# Patient Record
Sex: FEMALE | Race: WHITE | Employment: PART TIME | URBAN - METROPOLITAN AREA
[De-identification: names, ages, dates, MRNs, and addresses within clinical notes are randomized per-mention and may not be internally consistent; named-entity substitution may affect disease eponyms.]

---

## 2023-03-30 ENCOUNTER — APPOINTMENT (OUTPATIENT)
Dept: CT IMAGING | Age: 58
DRG: 263 | End: 2023-03-30
Payer: MEDICAID

## 2023-03-30 ENCOUNTER — HOSPITAL ENCOUNTER (INPATIENT)
Age: 58
LOS: 5 days | Discharge: HOME | DRG: 263 | End: 2023-04-05
Attending: EMERGENCY MEDICINE | Admitting: INTERNAL MEDICINE
Payer: MEDICAID

## 2023-03-30 ENCOUNTER — APPOINTMENT (OUTPATIENT)
Dept: GENERAL RADIOLOGY | Age: 58
DRG: 263 | End: 2023-03-30
Payer: MEDICAID

## 2023-03-30 ENCOUNTER — APPOINTMENT (OUTPATIENT)
Dept: ULTRASOUND IMAGING | Age: 58
DRG: 263 | End: 2023-03-30
Payer: MEDICAID

## 2023-03-30 DIAGNOSIS — K83.1 OBSTRUCTION OF BILE DUCT: ICD-10-CM

## 2023-03-30 DIAGNOSIS — K80.50 CHOLEDOCHOLITHIASIS: Primary | ICD-10-CM

## 2023-03-30 DIAGNOSIS — K81.0 ACUTE CHOLECYSTITIS: ICD-10-CM

## 2023-03-30 LAB
ALBUMIN SERPL-MCNC: 4.3 G/DL (ref 3.5–5.2)
ALP SERPL-CCNC: 158 U/L (ref 35–104)
ALT SERPL-CCNC: 942 U/L (ref 0–32)
ANION GAP SERPL CALCULATED.3IONS-SCNC: 9 MMOL/L (ref 7–16)
AST SERPL-CCNC: 1280 U/L (ref 0–31)
BACTERIA URNS QL MICRO: NORMAL /HPF
BASOPHILS # BLD: 0.11 E9/L (ref 0–0.2)
BASOPHILS NFR BLD: 1.2 % (ref 0–2)
BILIRUB DIRECT SERPL-MCNC: 2.2 MG/DL (ref 0–0.3)
BILIRUB INDIRECT SERPL-MCNC: 0.4 MG/DL (ref 0–1)
BILIRUB SERPL-MCNC: 2.6 MG/DL (ref 0–1.2)
BILIRUB UR QL STRIP: NEGATIVE
BUN SERPL-MCNC: 10 MG/DL (ref 6–20)
CALCIUM SERPL-MCNC: 9.1 MG/DL (ref 8.6–10.2)
CHLORIDE SERPL-SCNC: 103 MMOL/L (ref 98–107)
CLARITY UR: CLEAR
CO2 SERPL-SCNC: 26 MMOL/L (ref 22–29)
COLOR UR: YELLOW
CREAT SERPL-MCNC: 0.6 MG/DL (ref 0.5–1)
EOSINOPHIL # BLD: 0.12 E9/L (ref 0.05–0.5)
EOSINOPHIL NFR BLD: 1.3 % (ref 0–6)
ERYTHROCYTE [DISTWIDTH] IN BLOOD BY AUTOMATED COUNT: 14.2 FL (ref 11.5–15)
GLUCOSE SERPL-MCNC: 90 MG/DL (ref 74–99)
GLUCOSE UR STRIP-MCNC: NEGATIVE MG/DL
HCT VFR BLD AUTO: 40.1 % (ref 34–48)
HGB BLD-MCNC: 13.6 G/DL (ref 11.5–15.5)
HGB UR QL STRIP: NEGATIVE
IMM GRANULOCYTES # BLD: 0.03 E9/L
IMM GRANULOCYTES NFR BLD: 0.3 % (ref 0–5)
KETONES UR STRIP-MCNC: NEGATIVE MG/DL
LACTATE BLDV-SCNC: 0.7 MMOL/L (ref 0.5–2.2)
LEUKOCYTE ESTERASE UR QL STRIP: NEGATIVE
LIPASE: 25 U/L (ref 13–60)
LYMPHOCYTES # BLD: 2.55 E9/L (ref 1.5–4)
LYMPHOCYTES NFR BLD: 28.4 % (ref 20–42)
MCH RBC QN AUTO: 29.2 PG (ref 26–35)
MCHC RBC AUTO-ENTMCNC: 33.9 % (ref 32–34.5)
MCV RBC AUTO: 86.1 FL (ref 80–99.9)
MONOCYTES # BLD: 0.8 E9/L (ref 0.1–0.95)
MONOCYTES NFR BLD: 8.9 % (ref 2–12)
NEUTROPHILS # BLD: 5.38 E9/L (ref 1.8–7.3)
NEUTS SEG NFR BLD: 59.9 % (ref 43–80)
NITRITE UR QL STRIP: NEGATIVE
PH UR STRIP: 6.5 [PH] (ref 5–9)
PLATELET # BLD AUTO: 248 E9/L (ref 130–450)
PMV BLD AUTO: 12.1 FL (ref 7–12)
POTASSIUM SERPL-SCNC: 3.7 MMOL/L (ref 3.5–5)
PROT SERPL-MCNC: 7.3 G/DL (ref 6.4–8.3)
PROT UR STRIP-MCNC: NEGATIVE MG/DL
RBC # BLD AUTO: 4.66 E12/L (ref 3.5–5.5)
RBC #/AREA URNS HPF: NORMAL /HPF (ref 0–2)
SODIUM SERPL-SCNC: 138 MMOL/L (ref 132–146)
SP GR UR STRIP: <=1.005 (ref 1–1.03)
TROPONIN, HIGH SENSITIVITY: <6 NG/L (ref 0–9)
UROBILINOGEN UR STRIP-ACNC: 0.2 E.U./DL
WBC # BLD: 9 E9/L (ref 4.5–11.5)
WBC #/AREA URNS HPF: NORMAL /HPF (ref 0–5)

## 2023-03-30 PROCEDURE — 76705 ECHO EXAM OF ABDOMEN: CPT

## 2023-03-30 PROCEDURE — 71046 X-RAY EXAM CHEST 2 VIEWS: CPT

## 2023-03-30 PROCEDURE — 81001 URINALYSIS AUTO W/SCOPE: CPT

## 2023-03-30 PROCEDURE — 2500000003 HC RX 250 WO HCPCS: Performed by: EMERGENCY MEDICINE

## 2023-03-30 PROCEDURE — 80076 HEPATIC FUNCTION PANEL: CPT

## 2023-03-30 PROCEDURE — 99285 EMERGENCY DEPT VISIT HI MDM: CPT

## 2023-03-30 PROCEDURE — 74177 CT ABD & PELVIS W/CONTRAST: CPT

## 2023-03-30 PROCEDURE — 84484 ASSAY OF TROPONIN QUANT: CPT

## 2023-03-30 PROCEDURE — 83605 ASSAY OF LACTIC ACID: CPT

## 2023-03-30 PROCEDURE — 80048 BASIC METABOLIC PNL TOTAL CA: CPT

## 2023-03-30 PROCEDURE — 93005 ELECTROCARDIOGRAM TRACING: CPT | Performed by: EMERGENCY MEDICINE

## 2023-03-30 PROCEDURE — 96365 THER/PROPH/DIAG IV INF INIT: CPT

## 2023-03-30 PROCEDURE — 83690 ASSAY OF LIPASE: CPT

## 2023-03-30 PROCEDURE — 96375 TX/PRO/DX INJ NEW DRUG ADDON: CPT

## 2023-03-30 PROCEDURE — 2580000003 HC RX 258: Performed by: EMERGENCY MEDICINE

## 2023-03-30 PROCEDURE — 6360000004 HC RX CONTRAST MEDICATION: Performed by: RADIOLOGY

## 2023-03-30 PROCEDURE — 6360000002 HC RX W HCPCS: Performed by: EMERGENCY MEDICINE

## 2023-03-30 PROCEDURE — 85025 COMPLETE CBC W/AUTO DIFF WBC: CPT

## 2023-03-30 RX ORDER — METRONIDAZOLE 500 MG/100ML
500 INJECTION, SOLUTION INTRAVENOUS ONCE
Status: COMPLETED | OUTPATIENT
Start: 2023-03-30 | End: 2023-03-31

## 2023-03-30 RX ADMIN — WATER 2000 MG: 1 INJECTION INTRAMUSCULAR; INTRAVENOUS; SUBCUTANEOUS at 23:01

## 2023-03-30 RX ADMIN — METRONIDAZOLE 500 MG: 500 INJECTION, SOLUTION INTRAVENOUS at 23:13

## 2023-03-30 RX ADMIN — IOPAMIDOL 75 ML: 755 INJECTION, SOLUTION INTRAVENOUS at 20:49

## 2023-03-30 ASSESSMENT — PAIN DESCRIPTION - DESCRIPTORS: DESCRIPTORS_2: ACHING

## 2023-03-30 ASSESSMENT — LIFESTYLE VARIABLES
HOW OFTEN DO YOU HAVE A DRINK CONTAINING ALCOHOL: NEVER
HOW MANY STANDARD DRINKS CONTAINING ALCOHOL DO YOU HAVE ON A TYPICAL DAY: PATIENT DOES NOT DRINK

## 2023-03-30 ASSESSMENT — PAIN - FUNCTIONAL ASSESSMENT
PAIN_FUNCTIONAL_ASSESSMENT: NONE - DENIES PAIN
PAIN_FUNCTIONAL_ASSESSMENT: NONE - DENIES PAIN

## 2023-03-30 ASSESSMENT — PAIN DESCRIPTION - LOCATION
LOCATION: ABDOMEN
LOCATION_2: CHEST

## 2023-03-30 ASSESSMENT — PAIN DESCRIPTION - FREQUENCY: FREQUENCY: INTERMITTENT

## 2023-03-30 ASSESSMENT — PAIN SCALES - GENERAL: PAINLEVEL_OUTOF10: 5

## 2023-03-30 ASSESSMENT — PAIN DESCRIPTION - ORIENTATION
ORIENTATION: UPPER
ORIENTATION_2: MID

## 2023-03-30 ASSESSMENT — PAIN DESCRIPTION - INTENSITY: RATING_2: 4

## 2023-03-30 ASSESSMENT — PAIN DESCRIPTION - PAIN TYPE: TYPE: ACUTE PAIN

## 2023-03-30 NOTE — ED PROVIDER NOTES
recognition software. Every effort was made to ensure accuracy; however, inadvertent computerized transcription errors may be present.   END OF ED PROVIDER NOTE      MERLENE Zarate - CNP  03/31/23 MERLENE Burr - SHAKIRA  03/31/23 5702

## 2023-03-31 ENCOUNTER — APPOINTMENT (OUTPATIENT)
Dept: MRI IMAGING | Age: 58
DRG: 263 | End: 2023-03-31
Payer: MEDICAID

## 2023-03-31 PROBLEM — R07.89 OTHER CHEST PAIN: Status: ACTIVE | Noted: 2023-03-31

## 2023-03-31 PROBLEM — J43.8 OTHER EMPHYSEMA (HCC): Status: ACTIVE | Noted: 2023-03-31

## 2023-03-31 PROBLEM — K81.0 ACUTE CHOLECYSTITIS: Status: ACTIVE | Noted: 2023-03-31

## 2023-03-31 PROBLEM — M79.7 FIBROMYALGIA: Status: ACTIVE | Noted: 2023-03-31

## 2023-03-31 PROBLEM — K83.1 OBSTRUCTIVE JAUNDICE: Status: ACTIVE | Noted: 2023-03-31

## 2023-03-31 PROBLEM — K83.8 COMMON BILE DUCT DILATATION: Status: ACTIVE | Noted: 2023-03-31

## 2023-03-31 PROBLEM — R94.31 NONSPECIFIC ST-T WAVE ELECTROCARDIOGRAPHIC CHANGES: Status: ACTIVE | Noted: 2023-03-31

## 2023-03-31 PROBLEM — R82.998 ORANGE-COLORED URINE: Status: ACTIVE | Noted: 2023-03-31

## 2023-03-31 LAB
ALBUMIN SERPL-MCNC: 3.7 G/DL (ref 3.5–5.2)
ALP SERPL-CCNC: 154 U/L (ref 35–104)
ALT SERPL-CCNC: 842 U/L (ref 0–32)
AMYLASE SERPL-CCNC: 32 U/L (ref 20–100)
ANION GAP SERPL CALCULATED.3IONS-SCNC: 10 MMOL/L (ref 7–16)
AST SERPL-CCNC: 814 U/L (ref 0–31)
BASOPHILS # BLD: 0.13 E9/L (ref 0–0.2)
BASOPHILS NFR BLD: 1.7 % (ref 0–2)
BILIRUB DIRECT SERPL-MCNC: 2.1 MG/DL (ref 0–0.3)
BILIRUB INDIRECT SERPL-MCNC: 0.5 MG/DL (ref 0–1)
BILIRUB SERPL-MCNC: 2.6 MG/DL (ref 0–1.2)
BUN SERPL-MCNC: 8 MG/DL (ref 6–20)
CALCIUM SERPL-MCNC: 8.9 MG/DL (ref 8.6–10.2)
CHLORIDE SERPL-SCNC: 106 MMOL/L (ref 98–107)
CO2 SERPL-SCNC: 24 MMOL/L (ref 22–29)
CREAT SERPL-MCNC: 0.6 MG/DL (ref 0.5–1)
EKG ATRIAL RATE: 73 BPM
EKG P AXIS: 26 DEGREES
EKG P-R INTERVAL: 146 MS
EKG Q-T INTERVAL: 376 MS
EKG QRS DURATION: 78 MS
EKG QTC CALCULATION (BAZETT): 414 MS
EKG R AXIS: 27 DEGREES
EKG T AXIS: 64 DEGREES
EKG VENTRICULAR RATE: 73 BPM
EOSINOPHIL # BLD: 0.16 E9/L (ref 0.05–0.5)
EOSINOPHIL NFR BLD: 2 % (ref 0–6)
ERYTHROCYTE [DISTWIDTH] IN BLOOD BY AUTOMATED COUNT: 14.1 FL (ref 11.5–15)
GLUCOSE SERPL-MCNC: 90 MG/DL (ref 74–99)
HCT VFR BLD AUTO: 40 % (ref 34–48)
HGB BLD-MCNC: 13.3 G/DL (ref 11.5–15.5)
IMM GRANULOCYTES # BLD: 0.04 E9/L
IMM GRANULOCYTES NFR BLD: 0.5 % (ref 0–5)
LIPASE: 17 U/L (ref 13–60)
LYMPHOCYTES # BLD: 2.6 E9/L (ref 1.5–4)
LYMPHOCYTES NFR BLD: 33.2 % (ref 20–42)
MCH RBC QN AUTO: 28.6 PG (ref 26–35)
MCHC RBC AUTO-ENTMCNC: 33.3 % (ref 32–34.5)
MCV RBC AUTO: 86 FL (ref 80–99.9)
MONOCYTES # BLD: 0.77 E9/L (ref 0.1–0.95)
MONOCYTES NFR BLD: 9.8 % (ref 2–12)
NEUTROPHILS # BLD: 4.14 E9/L (ref 1.8–7.3)
NEUTS SEG NFR BLD: 52.8 % (ref 43–80)
PLATELET # BLD AUTO: 235 E9/L (ref 130–450)
PMV BLD AUTO: 12.3 FL (ref 7–12)
POTASSIUM SERPL-SCNC: 4.1 MMOL/L (ref 3.5–5)
PROCALCITONIN: 0.1 NG/ML (ref 0–0.08)
PROT SERPL-MCNC: 7 G/DL (ref 6.4–8.3)
RBC # BLD AUTO: 4.65 E12/L (ref 3.5–5.5)
SODIUM SERPL-SCNC: 140 MMOL/L (ref 132–146)
TROPONIN, HIGH SENSITIVITY: <6 NG/L (ref 0–9)
WBC # BLD: 7.8 E9/L (ref 4.5–11.5)

## 2023-03-31 PROCEDURE — 84484 ASSAY OF TROPONIN QUANT: CPT

## 2023-03-31 PROCEDURE — 93010 ELECTROCARDIOGRAM REPORT: CPT | Performed by: INTERNAL MEDICINE

## 2023-03-31 PROCEDURE — 99222 1ST HOSP IP/OBS MODERATE 55: CPT | Performed by: INTERNAL MEDICINE

## 2023-03-31 PROCEDURE — A9579 GAD-BASE MR CONTRAST NOS,1ML: HCPCS | Performed by: RADIOLOGY

## 2023-03-31 PROCEDURE — 36415 COLL VENOUS BLD VENIPUNCTURE: CPT

## 2023-03-31 PROCEDURE — 85025 COMPLETE CBC W/AUTO DIFF WBC: CPT

## 2023-03-31 PROCEDURE — 74183 MRI ABD W/O CNTR FLWD CNTR: CPT

## 2023-03-31 PROCEDURE — 2500000003 HC RX 250 WO HCPCS

## 2023-03-31 PROCEDURE — 6360000004 HC RX CONTRAST MEDICATION: Performed by: RADIOLOGY

## 2023-03-31 PROCEDURE — 86301 IMMUNOASSAY TUMOR CA 19-9: CPT

## 2023-03-31 PROCEDURE — 6370000000 HC RX 637 (ALT 250 FOR IP): Performed by: INTERNAL MEDICINE

## 2023-03-31 PROCEDURE — 84145 PROCALCITONIN (PCT): CPT

## 2023-03-31 PROCEDURE — 6360000002 HC RX W HCPCS

## 2023-03-31 PROCEDURE — 80048 BASIC METABOLIC PNL TOTAL CA: CPT

## 2023-03-31 PROCEDURE — 2060000000 HC ICU INTERMEDIATE R&B

## 2023-03-31 PROCEDURE — 2580000003 HC RX 258

## 2023-03-31 PROCEDURE — 6360000002 HC RX W HCPCS: Performed by: INTERNAL MEDICINE

## 2023-03-31 PROCEDURE — 80076 HEPATIC FUNCTION PANEL: CPT

## 2023-03-31 PROCEDURE — 82150 ASSAY OF AMYLASE: CPT

## 2023-03-31 PROCEDURE — 83690 ASSAY OF LIPASE: CPT

## 2023-03-31 RX ORDER — DOCUSATE SODIUM 100 MG/1
200 CAPSULE, LIQUID FILLED ORAL DAILY
Status: DISCONTINUED | OUTPATIENT
Start: 2023-03-31 | End: 2023-04-05 | Stop reason: HOSPADM

## 2023-03-31 RX ORDER — MORPHINE SULFATE 2 MG/ML
2 INJECTION, SOLUTION INTRAMUSCULAR; INTRAVENOUS
Status: DISCONTINUED | OUTPATIENT
Start: 2023-03-31 | End: 2023-04-05

## 2023-03-31 RX ORDER — ONDANSETRON 2 MG/ML
4 INJECTION INTRAMUSCULAR; INTRAVENOUS EVERY 6 HOURS PRN
Status: DISCONTINUED | OUTPATIENT
Start: 2023-03-31 | End: 2023-04-05 | Stop reason: HOSPADM

## 2023-03-31 RX ORDER — BISACODYL 5 MG/1
5 TABLET, DELAYED RELEASE ORAL DAILY PRN
Status: DISCONTINUED | OUTPATIENT
Start: 2023-03-31 | End: 2023-04-05 | Stop reason: HOSPADM

## 2023-03-31 RX ORDER — METRONIDAZOLE 500 MG/100ML
500 INJECTION, SOLUTION INTRAVENOUS EVERY 8 HOURS
Status: DISCONTINUED | OUTPATIENT
Start: 2023-03-31 | End: 2023-04-05

## 2023-03-31 RX ORDER — NICOTINE 21 MG/24HR
1 PATCH, TRANSDERMAL 24 HOURS TRANSDERMAL DAILY
Status: DISCONTINUED | OUTPATIENT
Start: 2023-03-31 | End: 2023-04-05 | Stop reason: HOSPADM

## 2023-03-31 RX ADMIN — METRONIDAZOLE 500 MG: 500 INJECTION, SOLUTION INTRAVENOUS at 09:24

## 2023-03-31 RX ADMIN — METRONIDAZOLE 500 MG: 500 INJECTION, SOLUTION INTRAVENOUS at 23:11

## 2023-03-31 RX ADMIN — METRONIDAZOLE 500 MG: 500 INJECTION, SOLUTION INTRAVENOUS at 18:07

## 2023-03-31 RX ADMIN — WATER 2000 MG: 1 INJECTION INTRAMUSCULAR; INTRAVENOUS; SUBCUTANEOUS at 23:11

## 2023-03-31 RX ADMIN — GADOTERIDOL 10 ML: 279.3 INJECTION, SOLUTION INTRAVENOUS at 12:15

## 2023-03-31 RX ADMIN — MORPHINE SULFATE 2 MG: 2 INJECTION, SOLUTION INTRAMUSCULAR; INTRAVENOUS at 20:31

## 2023-03-31 RX ADMIN — MORPHINE SULFATE 2 MG: 2 INJECTION, SOLUTION INTRAMUSCULAR; INTRAVENOUS at 13:10

## 2023-03-31 ASSESSMENT — PAIN SCALES - GENERAL
PAINLEVEL_OUTOF10: 3
PAINLEVEL_OUTOF10: 5
PAINLEVEL_OUTOF10: 3
PAINLEVEL_OUTOF10: 10
PAINLEVEL_OUTOF10: 2

## 2023-03-31 ASSESSMENT — PAIN - FUNCTIONAL ASSESSMENT
PAIN_FUNCTIONAL_ASSESSMENT: PREVENTS OR INTERFERES SOME ACTIVE ACTIVITIES AND ADLS
PAIN_FUNCTIONAL_ASSESSMENT: PREVENTS OR INTERFERES SOME ACTIVE ACTIVITIES AND ADLS
PAIN_FUNCTIONAL_ASSESSMENT: 0-10

## 2023-03-31 ASSESSMENT — PAIN DESCRIPTION - LOCATION
LOCATION: RIB CAGE;BACK
LOCATION: ABDOMEN;BACK

## 2023-03-31 ASSESSMENT — PAIN DESCRIPTION - DESCRIPTORS
DESCRIPTORS: SORE;STABBING;ACHING
DESCRIPTORS: SHOOTING;STABBING

## 2023-03-31 ASSESSMENT — PAIN DESCRIPTION - ORIENTATION: ORIENTATION: RIGHT;LEFT;ANTERIOR;POSTERIOR

## 2023-03-31 NOTE — CARE COORDINATION
Introduced my self and provided explanation of CM role to patient. Patient is awake, alert, and aware of current diagnosis and treatment plan including gi and gen surg consults, IV atb. She voices she resides at home with her spouse and completes her adl's with independence. Patient is established with a pcp and denies any issue with retail pharmaceutical coverage. Patient verbalizes no concerns and identifies no areas to focus on nor barriers to discharge. Explained ELOS of 48-72 hours; patient voiced understanding and agreement. Will follow along with  and assist with discharge planning as necessary. Case Management Assessment  Initial Evaluation    Date/Time of Evaluation: 3/31/2023 10:38 AM  Assessment Completed by: Lissette Yang RN    If patient is discharged prior to next notation, then this note serves as note for discharge by case management. Patient Name: Viola Molina                   YOB: 1965  Diagnosis: Acute cholecystitis [K81.0]  Choledocholithiasis [K80.50]                   Date / Time: 3/30/2023  6:29 PM    Patient Admission Status: Inpatient   Readmission Risk (Low < 19, Mod (19-27), High > 27): Readmission Risk Score: 6.2    Current PCP: Polina Coles MD  PCP verified by CM? Yes    Chart Reviewed: Yes      History Provided by: Patient  Patient Orientation: Alert and Oriented, Person, Place, Self, Situation    Patient Cognition: Alert    Hospitalization in the last 30 days (Readmission):  No    If yes, Readmission Assessment in CM Navigator will be completed.     Advance Directives:      Code Status: Full Code   Patient's Primary Decision Maker is: Legal Next of Kin      Discharge Planning:    Patient lives with: Spouse/Significant Other Type of Home: House  Primary Care Giver: Self  Patient Support Systems include: Spouse/Significant Other   Current Financial resources:    Current community resources:    Current services prior to admission: None

## 2023-03-31 NOTE — ED NOTES
ED to Inpatient Handoff Report    Notified 6W that electronic handoff available and patient ready for transport to room [phone]. Safety Risks: None identified    Patient in Restraints: no    Constant Observer or Patient : no    Telemetry Monitoring Ordered :Yes           Order to transfer to unit without monitor:YES    Last MEWS: 1 Time completed: 0053    Vitals:    03/30/23 1823 03/30/23 2355 03/31/23 0053   BP: (!) 127/58 (!) 114/46 (!) 117/55   Pulse: 89 68 62   Resp: 16 16 16   Temp: 97.6 °F (36.4 °C)  97.9 °F (36.6 °C)   TempSrc: Oral     SpO2: 95% 97% 95%   Weight: 120 lb (54.4 kg)     Height: 4' 11\" (1.499 m)         Opportunity for questions and clarification was provided.        Moses Rosario RN  03/31/23 2224

## 2023-03-31 NOTE — H&P
CT abdomen pelvis, 07/15/2011 HISTORY: ORDERING SYSTEM PROVIDED HISTORY: abd pain TECHNOLOGIST PROVIDED HISTORY: Reason for exam:->abd pain Additional Contrast?->None Decision Support Exception - unselect if not a suspected or confirmed emergency medical condition->Emergency Medical Condition (MA) FINDINGS: Lower Chest: There is trace subsegmental atelectasis in right middle lobe and lingula. Liver: Normal size and contour. No infiltrative process or space-occupying lesion. Gallbladder/biliary tree: There are multiple intraluminal gallstones, gallbladder wall enhancement, and pericholecystic fluid. These features have high association with acute cholecystitis. There is minimal intrahepatic biliary dilatation in the common bile duct is borderline prominent (6.1 mm). Spleen: Normal size and contour. Pancreas: No significant findings. Adrenal glands: Appropriate size and configuration. Kidneys: Normal anatomic position. No space-occupying lesion. No significant genitourinary tract calculi or hydronephrosis. GI/Bowel: No significant bowel distention. There are multiple diverticuli along the distal descending and sigmoid colon. No evidence for diverticulitis. Appendix: Unremarkable. Pelvis: The visualized uterus and ovaries are within normal limits for patient's age. The urinary bladder is moderately distended. Peritoneum/Retroperitoneum: There is advanced aortoiliac atherosclerotic disease. The IVC is within normal limits. No free fluid or free air. Bones/Soft Tissues: No significant findings. Suspect acute calculus cholecystitis. Borderline prominent common bile duct. No CT evidence for choledocholithiasis. Distal descending and sigmoid colon diverticulosis. US GALLBLADDER RUQ    Result Date: 3/30/2023  EXAMINATION: RIGHT UPPER QUADRANT ULTRASOUND 3/30/2023 7:17 pm COMPARISON: None.  HISTORY: ORDERING SYSTEM PROVIDED HISTORY: ruq pain TECHNOLOGIST PROVIDED HISTORY: Reason for exam:->ruq pain What reading

## 2023-04-01 PROBLEM — R79.89 ELEVATED LFTS: Status: ACTIVE | Noted: 2023-04-01

## 2023-04-01 PROBLEM — R10.11 RIGHT UPPER QUADRANT ABDOMINAL PAIN: Status: ACTIVE | Noted: 2023-04-01

## 2023-04-01 PROBLEM — K80.50 CHOLEDOCHOLITHIASIS: Status: ACTIVE | Noted: 2023-04-01

## 2023-04-01 LAB
ALBUMIN SERPL-MCNC: 3.6 G/DL (ref 3.5–5.2)
ALP SERPL-CCNC: 172 U/L (ref 35–104)
ALT SERPL-CCNC: 594 U/L (ref 0–32)
ANION GAP SERPL CALCULATED.3IONS-SCNC: 8 MMOL/L (ref 7–16)
AST SERPL-CCNC: 358 U/L (ref 0–31)
BILIRUB SERPL-MCNC: 1.5 MG/DL (ref 0–1.2)
BUN SERPL-MCNC: 10 MG/DL (ref 6–20)
CALCIUM SERPL-MCNC: 9 MG/DL (ref 8.6–10.2)
CHLORIDE SERPL-SCNC: 109 MMOL/L (ref 98–107)
CHOLESTEROL, TOTAL: 226 MG/DL (ref 0–199)
CO2 SERPL-SCNC: 24 MMOL/L (ref 22–29)
CREAT SERPL-MCNC: 0.6 MG/DL (ref 0.5–1)
ERYTHROCYTE [DISTWIDTH] IN BLOOD BY AUTOMATED COUNT: 14.2 FL (ref 11.5–15)
GLUCOSE SERPL-MCNC: 92 MG/DL (ref 74–99)
HCT VFR BLD AUTO: 38.2 % (ref 34–48)
HDLC SERPL-MCNC: 53 MG/DL
HGB BLD-MCNC: 12.9 G/DL (ref 11.5–15.5)
LDLC SERPL CALC-MCNC: 148 MG/DL (ref 0–99)
LV EF: 63 %
LVEF MODALITY: NORMAL
MCH RBC QN AUTO: 28.9 PG (ref 26–35)
MCHC RBC AUTO-ENTMCNC: 33.8 % (ref 32–34.5)
MCV RBC AUTO: 85.7 FL (ref 80–99.9)
PLATELET # BLD AUTO: 232 E9/L (ref 130–450)
PMV BLD AUTO: 12.4 FL (ref 7–12)
POTASSIUM SERPL-SCNC: 4.1 MMOL/L (ref 3.5–5)
PROT SERPL-MCNC: 6.7 G/DL (ref 6.4–8.3)
RBC # BLD AUTO: 4.46 E12/L (ref 3.5–5.5)
SODIUM SERPL-SCNC: 141 MMOL/L (ref 132–146)
TRIGL SERPL-MCNC: 124 MG/DL (ref 0–149)
VLDLC SERPL CALC-MCNC: 25 MG/DL
WBC # BLD: 7.4 E9/L (ref 4.5–11.5)

## 2023-04-01 PROCEDURE — 93306 TTE W/DOPPLER COMPLETE: CPT

## 2023-04-01 PROCEDURE — 99233 SBSQ HOSP IP/OBS HIGH 50: CPT | Performed by: INTERNAL MEDICINE

## 2023-04-01 PROCEDURE — 6360000002 HC RX W HCPCS

## 2023-04-01 PROCEDURE — 80053 COMPREHEN METABOLIC PANEL: CPT

## 2023-04-01 PROCEDURE — 6370000000 HC RX 637 (ALT 250 FOR IP): Performed by: NURSE PRACTITIONER

## 2023-04-01 PROCEDURE — 2500000003 HC RX 250 WO HCPCS

## 2023-04-01 PROCEDURE — 2060000000 HC ICU INTERMEDIATE R&B

## 2023-04-01 PROCEDURE — 6370000000 HC RX 637 (ALT 250 FOR IP): Performed by: INTERNAL MEDICINE

## 2023-04-01 PROCEDURE — APPSS60 APP SPLIT SHARED TIME 46-60 MINUTES: Performed by: PHYSICIAN ASSISTANT

## 2023-04-01 PROCEDURE — 80061 LIPID PANEL: CPT

## 2023-04-01 PROCEDURE — 6360000002 HC RX W HCPCS: Performed by: INTERNAL MEDICINE

## 2023-04-01 PROCEDURE — 99254 IP/OBS CNSLTJ NEW/EST MOD 60: CPT | Performed by: INTERNAL MEDICINE

## 2023-04-01 PROCEDURE — 2580000003 HC RX 258

## 2023-04-01 PROCEDURE — 36415 COLL VENOUS BLD VENIPUNCTURE: CPT

## 2023-04-01 PROCEDURE — 85027 COMPLETE CBC AUTOMATED: CPT

## 2023-04-01 RX ADMIN — METRONIDAZOLE 500 MG: 500 INJECTION, SOLUTION INTRAVENOUS at 23:23

## 2023-04-01 RX ADMIN — DOCUSATE SODIUM 200 MG: 100 CAPSULE ORAL at 08:09

## 2023-04-01 RX ADMIN — MORPHINE SULFATE 2 MG: 2 INJECTION, SOLUTION INTRAMUSCULAR; INTRAVENOUS at 23:10

## 2023-04-01 RX ADMIN — METRONIDAZOLE 500 MG: 500 INJECTION, SOLUTION INTRAVENOUS at 15:22

## 2023-04-01 RX ADMIN — MORPHINE SULFATE 2 MG: 2 INJECTION, SOLUTION INTRAMUSCULAR; INTRAVENOUS at 18:18

## 2023-04-01 RX ADMIN — WATER 2000 MG: 1 INJECTION INTRAMUSCULAR; INTRAVENOUS; SUBCUTANEOUS at 23:10

## 2023-04-01 RX ADMIN — ONDANSETRON 4 MG: 2 INJECTION INTRAMUSCULAR; INTRAVENOUS at 18:09

## 2023-04-01 RX ADMIN — METRONIDAZOLE 500 MG: 500 INJECTION, SOLUTION INTRAVENOUS at 07:02

## 2023-04-01 RX ADMIN — ONDANSETRON 4 MG: 2 INJECTION INTRAMUSCULAR; INTRAVENOUS at 06:42

## 2023-04-01 ASSESSMENT — PAIN - FUNCTIONAL ASSESSMENT
PAIN_FUNCTIONAL_ASSESSMENT: PREVENTS OR INTERFERES SOME ACTIVE ACTIVITIES AND ADLS
PAIN_FUNCTIONAL_ASSESSMENT: ACTIVITIES ARE NOT PREVENTED

## 2023-04-01 ASSESSMENT — PAIN SCALES - GENERAL
PAINLEVEL_OUTOF10: 4
PAINLEVEL_OUTOF10: 2
PAINLEVEL_OUTOF10: 0
PAINLEVEL_OUTOF10: 4

## 2023-04-01 ASSESSMENT — PAIN DESCRIPTION - LOCATION
LOCATION: ABDOMEN;RIB CAGE
LOCATION: ABDOMEN

## 2023-04-01 ASSESSMENT — PAIN DESCRIPTION - DESCRIPTORS
DESCRIPTORS: ACHING
DESCRIPTORS: ACHING;SORE;STABBING;SHARP

## 2023-04-01 ASSESSMENT — PAIN DESCRIPTION - ORIENTATION
ORIENTATION: RIGHT;LEFT;ANTERIOR;POSTERIOR
ORIENTATION: RIGHT

## 2023-04-01 NOTE — PLAN OF CARE
Problem: Discharge Planning  Goal: Discharge to home or other facility with appropriate resources  4/1/2023 1506 by Joel Oro RN  Outcome: Progressing  4/1/2023 0405 by Stacey Beltran RN  Outcome: Progressing     Problem: Pain  Goal: Verbalizes/displays adequate comfort level or baseline comfort level  4/1/2023 1506 by Joel Oro RN  Outcome: Progressing  4/1/2023 0405 by Stacey Beltran RN  Outcome: Progressing     Problem: Safety - Adult  Goal: Free from fall injury  4/1/2023 1506 by Joel Oro RN  Outcome: Progressing  4/1/2023 0405 by Stacey Beltran RN  Outcome: Progressing     Problem: ABCDS Injury Assessment  Goal: Absence of physical injury  4/1/2023 1506 by Joel Oro RN  Outcome: Progressing  4/1/2023 0405 by Stacey Beltran RN  Outcome: Progressing

## 2023-04-02 LAB
ALBUMIN SERPL-MCNC: 3.5 G/DL (ref 3.5–5.2)
ALP SERPL-CCNC: 161 U/L (ref 35–104)
ALT SERPL-CCNC: 397 U/L (ref 0–32)
ANION GAP SERPL CALCULATED.3IONS-SCNC: 10 MMOL/L (ref 7–16)
AST SERPL-CCNC: 136 U/L (ref 0–31)
BILIRUB SERPL-MCNC: 0.6 MG/DL (ref 0–1.2)
BILIRUB UR QL STRIP: NEGATIVE
BUN SERPL-MCNC: 13 MG/DL (ref 6–20)
CALCIUM SERPL-MCNC: 9.2 MG/DL (ref 8.6–10.2)
CANCER AG19-9 SERPL-ACNC: 16 U/ML
CHLORIDE SERPL-SCNC: 106 MMOL/L (ref 98–107)
CLARITY UR: CLEAR
CO2 SERPL-SCNC: 23 MMOL/L (ref 22–29)
COLOR UR: YELLOW
CREAT SERPL-MCNC: 0.7 MG/DL (ref 0.5–1)
ERYTHROCYTE [DISTWIDTH] IN BLOOD BY AUTOMATED COUNT: 14.1 FL (ref 11.5–15)
GLUCOSE SERPL-MCNC: 94 MG/DL (ref 74–99)
GLUCOSE UR STRIP-MCNC: NEGATIVE MG/DL
HCT VFR BLD AUTO: 37.9 % (ref 34–48)
HGB BLD-MCNC: 12.7 G/DL (ref 11.5–15.5)
HGB UR QL STRIP: NEGATIVE
KETONES UR STRIP-MCNC: NEGATIVE MG/DL
LEUKOCYTE ESTERASE UR QL STRIP: NEGATIVE
MCH RBC QN AUTO: 28.8 PG (ref 26–35)
MCHC RBC AUTO-ENTMCNC: 33.5 % (ref 32–34.5)
MCV RBC AUTO: 85.9 FL (ref 80–99.9)
NITRITE UR QL STRIP: NEGATIVE
PH UR STRIP: 6.5 [PH] (ref 5–9)
PLATELET # BLD AUTO: 235 E9/L (ref 130–450)
PMV BLD AUTO: 12.6 FL (ref 7–12)
POTASSIUM SERPL-SCNC: 4.1 MMOL/L (ref 3.5–5)
PROT SERPL-MCNC: 6.7 G/DL (ref 6.4–8.3)
PROT UR STRIP-MCNC: NEGATIVE MG/DL
RBC # BLD AUTO: 4.41 E12/L (ref 3.5–5.5)
SODIUM SERPL-SCNC: 139 MMOL/L (ref 132–146)
SP GR UR STRIP: <=1.005 (ref 1–1.03)
UROBILINOGEN UR STRIP-ACNC: 0.2 E.U./DL
WBC # BLD: 9.6 E9/L (ref 4.5–11.5)

## 2023-04-02 PROCEDURE — 2060000000 HC ICU INTERMEDIATE R&B

## 2023-04-02 PROCEDURE — 2580000003 HC RX 258

## 2023-04-02 PROCEDURE — 6360000002 HC RX W HCPCS: Performed by: INTERNAL MEDICINE

## 2023-04-02 PROCEDURE — 99233 SBSQ HOSP IP/OBS HIGH 50: CPT | Performed by: INTERNAL MEDICINE

## 2023-04-02 PROCEDURE — 85027 COMPLETE CBC AUTOMATED: CPT

## 2023-04-02 PROCEDURE — 6370000000 HC RX 637 (ALT 250 FOR IP): Performed by: INTERNAL MEDICINE

## 2023-04-02 PROCEDURE — 6360000002 HC RX W HCPCS

## 2023-04-02 PROCEDURE — 80053 COMPREHEN METABOLIC PANEL: CPT

## 2023-04-02 PROCEDURE — 36415 COLL VENOUS BLD VENIPUNCTURE: CPT

## 2023-04-02 PROCEDURE — 81003 URINALYSIS AUTO W/O SCOPE: CPT

## 2023-04-02 PROCEDURE — 2500000003 HC RX 250 WO HCPCS

## 2023-04-02 PROCEDURE — 6370000000 HC RX 637 (ALT 250 FOR IP): Performed by: NURSE PRACTITIONER

## 2023-04-02 RX ADMIN — METRONIDAZOLE 500 MG: 500 INJECTION, SOLUTION INTRAVENOUS at 07:03

## 2023-04-02 RX ADMIN — MORPHINE SULFATE 2 MG: 2 INJECTION, SOLUTION INTRAMUSCULAR; INTRAVENOUS at 21:16

## 2023-04-02 RX ADMIN — WATER 2000 MG: 1 INJECTION INTRAMUSCULAR; INTRAVENOUS; SUBCUTANEOUS at 23:33

## 2023-04-02 RX ADMIN — METRONIDAZOLE 500 MG: 500 INJECTION, SOLUTION INTRAVENOUS at 23:43

## 2023-04-02 RX ADMIN — METRONIDAZOLE 500 MG: 500 INJECTION, SOLUTION INTRAVENOUS at 15:14

## 2023-04-02 RX ADMIN — ONDANSETRON 4 MG: 2 INJECTION INTRAMUSCULAR; INTRAVENOUS at 06:36

## 2023-04-02 RX ADMIN — DOCUSATE SODIUM 200 MG: 100 CAPSULE ORAL at 08:22

## 2023-04-02 ASSESSMENT — PAIN SCALES - GENERAL
PAINLEVEL_OUTOF10: 3
PAINLEVEL_OUTOF10: 5
PAINLEVEL_OUTOF10: 0

## 2023-04-02 ASSESSMENT — PAIN DESCRIPTION - ORIENTATION: ORIENTATION: RIGHT

## 2023-04-02 ASSESSMENT — PAIN DESCRIPTION - LOCATION: LOCATION: ABDOMEN;BACK;RIB CAGE

## 2023-04-02 ASSESSMENT — PAIN - FUNCTIONAL ASSESSMENT: PAIN_FUNCTIONAL_ASSESSMENT: PREVENTS OR INTERFERES SOME ACTIVE ACTIVITIES AND ADLS

## 2023-04-02 NOTE — PLAN OF CARE
Problem: Discharge Planning  Goal: Discharge to home or other facility with appropriate resources  4/2/2023 1102 by Melanie Lindsey RN  Outcome: Progressing  4/2/2023 0349 by Tracy Pisano RN  Outcome: Progressing     Problem: Pain  Goal: Verbalizes/displays adequate comfort level or baseline comfort level  4/2/2023 1102 by Melanie Lindsey RN  Outcome: Progressing  4/2/2023 0349 by Tracy Pisano RN  Outcome: Progressing     Problem: Safety - Adult  Goal: Free from fall injury  4/2/2023 1102 by Melanie Lindsey RN  Outcome: Progressing  4/2/2023 0349 by Tracy Pisano RN  Outcome: Progressing     Problem: ABCDS Injury Assessment  Goal: Absence of physical injury  4/2/2023 1102 by Melanie Lindsey RN  Outcome: Progressing  4/2/2023 0349 by Tracy Pisano RN  Outcome: Progressing

## 2023-04-02 NOTE — PLAN OF CARE
Problem: Discharge Planning  Goal: Discharge to home or other facility with appropriate resources  4/2/2023 0349 by Elizabeth Arrington RN  Outcome: Progressing  4/1/2023 1506 by Jamari Aragon RN  Outcome: Progressing     Problem: Pain  Goal: Verbalizes/displays adequate comfort level or baseline comfort level  4/2/2023 0349 by Elizabeth Arrington RN  Outcome: Progressing  4/1/2023 1506 by Jamari Aragon RN  Outcome: Progressing     Problem: Safety - Adult  Goal: Free from fall injury  4/2/2023 0349 by Elizabeth Arrington RN  Outcome: Progressing  4/1/2023 1506 by Jamari Aragon RN  Outcome: Progressing     Problem: ABCDS Injury Assessment  Goal: Absence of physical injury  4/2/2023 0349 by Elizabeth Arrington RN  Outcome: Progressing  4/1/2023 1506 by Jamari Aragon RN  Outcome: Progressing

## 2023-04-03 ENCOUNTER — ANESTHESIA EVENT (OUTPATIENT)
Dept: OPERATING ROOM | Age: 58
DRG: 263 | End: 2023-04-03
Payer: MEDICAID

## 2023-04-03 PROBLEM — R30.0 DYSURIA: Status: ACTIVE | Noted: 2023-04-03

## 2023-04-03 LAB
ABO + RH BLD: NORMAL
ALBUMIN SERPL-MCNC: 3.6 G/DL (ref 3.5–5.2)
ALP SERPL-CCNC: 147 U/L (ref 35–104)
ALT SERPL-CCNC: 306 U/L (ref 0–32)
ANION GAP SERPL CALCULATED.3IONS-SCNC: 11 MMOL/L (ref 7–16)
AST SERPL-CCNC: 98 U/L (ref 0–31)
BILIRUB SERPL-MCNC: 0.6 MG/DL (ref 0–1.2)
BLD GP AB SCN SERPL QL: NORMAL
BUN SERPL-MCNC: 13 MG/DL (ref 6–20)
CALCIUM SERPL-MCNC: 9.4 MG/DL (ref 8.6–10.2)
CHLORIDE SERPL-SCNC: 103 MMOL/L (ref 98–107)
CO2 SERPL-SCNC: 23 MMOL/L (ref 22–29)
CREAT SERPL-MCNC: 0.6 MG/DL (ref 0.5–1)
ERYTHROCYTE [DISTWIDTH] IN BLOOD BY AUTOMATED COUNT: 14.1 FL (ref 11.5–15)
GLUCOSE SERPL-MCNC: 87 MG/DL (ref 74–99)
HCT VFR BLD AUTO: 38.3 % (ref 34–48)
HGB BLD-MCNC: 13 G/DL (ref 11.5–15.5)
MCH RBC QN AUTO: 29.1 PG (ref 26–35)
MCHC RBC AUTO-ENTMCNC: 33.9 % (ref 32–34.5)
MCV RBC AUTO: 85.7 FL (ref 80–99.9)
PLATELET # BLD AUTO: 237 E9/L (ref 130–450)
PMV BLD AUTO: 12 FL (ref 7–12)
POTASSIUM SERPL-SCNC: 4 MMOL/L (ref 3.5–5)
PROT SERPL-MCNC: 6.8 G/DL (ref 6.4–8.3)
RBC # BLD AUTO: 4.47 E12/L (ref 3.5–5.5)
SODIUM SERPL-SCNC: 137 MMOL/L (ref 132–146)
WBC # BLD: 8.6 E9/L (ref 4.5–11.5)

## 2023-04-03 PROCEDURE — 86850 RBC ANTIBODY SCREEN: CPT

## 2023-04-03 PROCEDURE — 36415 COLL VENOUS BLD VENIPUNCTURE: CPT

## 2023-04-03 PROCEDURE — 86901 BLOOD TYPING SEROLOGIC RH(D): CPT

## 2023-04-03 PROCEDURE — 2580000003 HC RX 258

## 2023-04-03 PROCEDURE — 99232 SBSQ HOSP IP/OBS MODERATE 35: CPT | Performed by: INTERNAL MEDICINE

## 2023-04-03 PROCEDURE — 86900 BLOOD TYPING SEROLOGIC ABO: CPT

## 2023-04-03 PROCEDURE — 80053 COMPREHEN METABOLIC PANEL: CPT

## 2023-04-03 PROCEDURE — 2500000003 HC RX 250 WO HCPCS

## 2023-04-03 PROCEDURE — 85027 COMPLETE CBC AUTOMATED: CPT

## 2023-04-03 PROCEDURE — 6360000002 HC RX W HCPCS

## 2023-04-03 PROCEDURE — 6370000000 HC RX 637 (ALT 250 FOR IP): Performed by: INTERNAL MEDICINE

## 2023-04-03 PROCEDURE — 6360000002 HC RX W HCPCS: Performed by: INTERNAL MEDICINE

## 2023-04-03 PROCEDURE — 6370000000 HC RX 637 (ALT 250 FOR IP): Performed by: NURSE PRACTITIONER

## 2023-04-03 PROCEDURE — 2060000000 HC ICU INTERMEDIATE R&B

## 2023-04-03 RX ORDER — INDOCYANINE GREEN AND WATER 25 MG
5 KIT INJECTION SEE ADMIN INSTRUCTIONS
Status: COMPLETED | OUTPATIENT
Start: 2023-04-04 | End: 2023-04-04

## 2023-04-03 RX ADMIN — ONDANSETRON 4 MG: 2 INJECTION INTRAMUSCULAR; INTRAVENOUS at 06:38

## 2023-04-03 RX ADMIN — METRONIDAZOLE 500 MG: 500 INJECTION, SOLUTION INTRAVENOUS at 08:13

## 2023-04-03 RX ADMIN — WATER 2000 MG: 1 INJECTION INTRAMUSCULAR; INTRAVENOUS; SUBCUTANEOUS at 22:28

## 2023-04-03 RX ADMIN — METRONIDAZOLE 500 MG: 500 INJECTION, SOLUTION INTRAVENOUS at 15:48

## 2023-04-03 RX ADMIN — METRONIDAZOLE 500 MG: 500 INJECTION, SOLUTION INTRAVENOUS at 22:27

## 2023-04-03 RX ADMIN — DOCUSATE SODIUM 200 MG: 100 CAPSULE ORAL at 08:08

## 2023-04-03 RX ADMIN — MORPHINE SULFATE 2 MG: 2 INJECTION, SOLUTION INTRAMUSCULAR; INTRAVENOUS at 20:58

## 2023-04-03 ASSESSMENT — LIFESTYLE VARIABLES: SMOKING_STATUS: 1

## 2023-04-03 ASSESSMENT — PAIN DESCRIPTION - DESCRIPTORS
DESCRIPTORS: TENDER
DESCRIPTORS: TENDER

## 2023-04-03 ASSESSMENT — PAIN SCALES - GENERAL
PAINLEVEL_OUTOF10: 0
PAINLEVEL_OUTOF10: 7
PAINLEVEL_OUTOF10: 2
PAINLEVEL_OUTOF10: 5

## 2023-04-03 ASSESSMENT — PAIN DESCRIPTION - LOCATION
LOCATION: ABDOMEN
LOCATION: ABDOMEN

## 2023-04-03 ASSESSMENT — COPD QUESTIONNAIRES: CAT_SEVERITY: MILD

## 2023-04-03 ASSESSMENT — PAIN DESCRIPTION - ORIENTATION
ORIENTATION: RIGHT
ORIENTATION: RIGHT

## 2023-04-03 NOTE — PLAN OF CARE
Problem: Discharge Planning  Goal: Discharge to home or other facility with appropriate resources  Outcome: Progressing  Flowsheets (Taken 4/2/2023 2130)  Discharge to home or other facility with appropriate resources: Identify barriers to discharge with patient and caregiver     Problem: Pain  Goal: Verbalizes/displays adequate comfort level or baseline comfort level  Outcome: Progressing     Problem: Safety - Adult  Goal: Free from fall injury  Outcome: Progressing  Flowsheets (Taken 4/3/2023 0430)  Free From Fall Injury: Instruct family/caregiver on patient safety     Problem: ABCDS Injury Assessment  Goal: Absence of physical injury  Outcome: Progressing  Flowsheets (Taken 4/3/2023 0430)  Absence of Physical Injury: Implement safety measures based on patient assessment

## 2023-04-03 NOTE — CARE COORDINATION
Transition of Care-Plan for Lap Rachel tomorrow. Anticipate discharge home within the next 48 hrs. Patient is independent, no anticipated needs.      Christiano JARAN, RN  [facility]

## 2023-04-03 NOTE — ANESTHESIA PRE PROCEDURE
Diagnosis Code    Acute cholecystitis K81.0    Common bile duct dilatation K83.8    Obstructive jaundice K83.1    Other chest pain R07.89    Orange-colored urine R82.998    Nonspecific ST-T wave electrocardiographic changes R94.31    Other emphysema (HCC) J43.8    Fibromyalgia M79.7    Choledocholithiasis K80.50    Right upper quadrant abdominal pain R10.11    Elevated LFTs R79.89    Dysuria R30.0       Past Medical History:  No past medical history on file. Past Surgical History:  No past surgical history on file. Social History:    Social History     Tobacco Use    Smoking status: Every Day     Packs/day: 0.50     Types: Cigarettes    Smokeless tobacco: Not on file   Substance Use Topics    Alcohol use: No                                Ready to quit: Not Answered  Counseling given: Not Answered      Vital Signs (Current):   Vitals:    04/02/23 2146 04/03/23 0330 04/03/23 0745 04/03/23 1109   BP:  (!) 108/44 99/61 118/60   Pulse:  61 60 62   Resp: 17 16 17 18   Temp:  97.8 °F (36.6 °C) (!) 96.6 °F (35.9 °C) 97.2 °F (36.2 °C)   TempSrc:  Oral Oral Oral   SpO2:  97% 95% 98%   Weight:       Height:                                                  BP Readings from Last 3 Encounters:   04/03/23 118/60   07/24/17 (!) 129/56       NPO Status:                                                                                 BMI:   Wt Readings from Last 3 Encounters:   03/31/23 110 lb 11.2 oz (50.2 kg)   03/31/23 110 lb (49.9 kg)   07/24/17 115 lb (52.2 kg)     Body mass index is 22.36 kg/m².     CBC:   Lab Results   Component Value Date/Time    WBC 8.6 04/03/2023 03:50 AM    RBC 4.47 04/03/2023 03:50 AM    HGB 13.0 04/03/2023 03:50 AM    HCT 38.3 04/03/2023 03:50 AM    MCV 85.7 04/03/2023 03:50 AM    RDW 14.1 04/03/2023 03:50 AM     04/03/2023 03:50 AM       CMP:   Lab Results   Component Value Date/Time     04/03/2023 03:50 AM    K 4.0 04/03/2023 03:50 AM     04/03/2023 03:50 AM

## 2023-04-03 NOTE — CONSULTS
breathing. CARDIOVASCULAR: RR  ABDOMEN:  Soft, non-distended, tender RUQ + murphys. No guarding, rigidity, rebound. EXTREMITIES:   MAEx4. Atraumatic. No LE edema. SKIN:  Warm and dry  NEUROLOGIC:  GCS 15    ASSESSMENT/PLAN:  56 y.o. female with acute cholecystitis     Continue abx   Plan for cholecystectomy 4/4  NPO at midnight, IVF  Type and screen   Pain and nausea control as needed  Monitor bili, wbc     Plan discussed with Dr. Gabi Gomez.     Abi Francis MD  Surgery Resident PGY-2  4/3/2023  8:08 AM
achievable. COMPARISON: CT abdomen pelvis, 07/15/2011 HISTORY: ORDERING SYSTEM PROVIDED HISTORY: abd pain TECHNOLOGIST PROVIDED HISTORY: Reason for exam:->abd pain Additional Contrast?->None Decision Support Exception - unselect if not a suspected or confirmed emergency medical condition->Emergency Medical Condition (MA) FINDINGS: Lower Chest: There is trace subsegmental atelectasis in right middle lobe and lingula. Liver: Normal size and contour. No infiltrative process or space-occupying lesion. Gallbladder/biliary tree: There are multiple intraluminal gallstones, gallbladder wall enhancement, and pericholecystic fluid. These features have high association with acute cholecystitis. There is minimal intrahepatic biliary dilatation in the common bile duct is borderline prominent (6.1 mm). Spleen: Normal size and contour. Pancreas: No significant findings. Adrenal glands: Appropriate size and configuration. Kidneys: Normal anatomic position. No space-occupying lesion. No significant genitourinary tract calculi or hydronephrosis. GI/Bowel: No significant bowel distention. There are multiple diverticuli along the distal descending and sigmoid colon. No evidence for diverticulitis. Appendix: Unremarkable. Pelvis: The visualized uterus and ovaries are within normal limits for patient's age. The urinary bladder is moderately distended. Peritoneum/Retroperitoneum: There is advanced aortoiliac atherosclerotic disease. The IVC is within normal limits. No free fluid or free air. Bones/Soft Tissues: No significant findings. Suspect acute calculus cholecystitis. Borderline prominent common bile duct. No CT evidence for choledocholithiasis. Distal descending and sigmoid colon diverticulosis. US GALLBLADDER RUQ    Result Date: 3/30/2023  EXAMINATION: RIGHT UPPER QUADRANT ULTRASOUND 3/30/2023 7:17 pm COMPARISON: None.  HISTORY: ORDERING SYSTEM PROVIDED HISTORY: ruq pain TECHNOLOGIST PROVIDED HISTORY: Reason for
There is no sonographic evidence for choledocholithiasis. RIGHT KIDNEY: The right kidney is grossly unremarkable without evidence of hydronephrosis. The right kidney measures approximate 9.3 cm. PANCREAS:  Visualized portions of the pancreas are unremarkable. OTHER: No evidence of right upper quadrant ascites. Cholelithiasis. Prominent common bile duct. RECOMMENDATIONS: MRCP. IMPRESSION:  Hyperbilirubinemia, suspicious for choledocholithiasis    Elevated LFT's  RUQ abdominal pain  Cholelithiasis   Acute cholecystitis- surgery following   [address] esophageal, pancreatic, colon and gastric cancer  Constipation  Heartburn     RECOMMENDATIONS:    MRI/MRCP ordered, will proceed with ERCP if indicated, TBD  Medicate for pain and nausea per admitting  Meridee Schwab for diet from GI POV after MRI   Discussed with patient, will need endoscopic evaluation screenings as OP given significant [address]  Continue antibiotics  Colace 200 mg daily  Dulcolax PRN ordered  Monitor CBC, CMP and lipase daily  Supportive care  Continue to monitor     Note: This report was completed utilizing computer voice recognition software. Every effort has been made to ensure accuracy, however; inadvertent computerized transcription errors may be present. Thank you very much for your consultation. We will follow closely with you.     Discussed with Dr. Adri Thomason developed by Dr. Radha Justin, APRN, NP-C 3/31/2023 11:23 AM for Dr. Ricardo Nicholas
V2 Q waves but no evidence of Q wave in V3 and subsequently not meeting criteria for septal infarct    Assessment  Preoperative cardiovascular evaluation prior to cholecystectomy  Cholecystitis  Elevated liver enzymes in the setting of cholecystitis  COPD with continued tobacco abuse  Pulmonary nodules  Tobacco abuse         Plan:   Patient report able to go up several flights of stairs without any limitation and EKG shows no acute abnormalities. Since patient is able to achieve more than 4 METS, she may proceed to surgery without additional cardiac testing. Her revised cardiac risk score of possible 30 days adverse cardiovascular outcome is 0.9%. There is no baseline echocardiogram and will obtain echo.   Smoking cessation is highly recommended  Fasting lipid profile to guide statin therapy  TSH  Continue to monitor on telemetry  Monitor blood pressure closely            Quin Lu MD  [facility] ([facility]) Cardiology

## 2023-04-04 ENCOUNTER — ANESTHESIA (OUTPATIENT)
Dept: OPERATING ROOM | Age: 58
DRG: 263 | End: 2023-04-04
Payer: MEDICAID

## 2023-04-04 ENCOUNTER — APPOINTMENT (OUTPATIENT)
Dept: GENERAL RADIOLOGY | Age: 58
DRG: 263 | End: 2023-04-04
Payer: MEDICAID

## 2023-04-04 LAB
ALBUMIN SERPL-MCNC: 3.6 G/DL (ref 3.5–5.2)
ALP SERPL-CCNC: 141 U/L (ref 35–104)
ALT SERPL-CCNC: 286 U/L (ref 0–32)
ANION GAP SERPL CALCULATED.3IONS-SCNC: 7 MMOL/L (ref 7–16)
AST SERPL-CCNC: 163 U/L (ref 0–31)
BILIRUB SERPL-MCNC: 0.6 MG/DL (ref 0–1.2)
BUN SERPL-MCNC: 11 MG/DL (ref 6–20)
CALCIUM SERPL-MCNC: 9.1 MG/DL (ref 8.6–10.2)
CHLORIDE SERPL-SCNC: 104 MMOL/L (ref 98–107)
CO2 SERPL-SCNC: 27 MMOL/L (ref 22–29)
CREAT SERPL-MCNC: 0.7 MG/DL (ref 0.5–1)
ERYTHROCYTE [DISTWIDTH] IN BLOOD BY AUTOMATED COUNT: 14.2 FL (ref 11.5–15)
GLUCOSE SERPL-MCNC: 97 MG/DL (ref 74–99)
HCT VFR BLD AUTO: 38.5 % (ref 34–48)
HGB BLD-MCNC: 12.8 G/DL (ref 11.5–15.5)
MCH RBC QN AUTO: 28.6 PG (ref 26–35)
MCHC RBC AUTO-ENTMCNC: 33.2 % (ref 32–34.5)
MCV RBC AUTO: 85.9 FL (ref 80–99.9)
PLATELET # BLD AUTO: 238 E9/L (ref 130–450)
PMV BLD AUTO: 12.1 FL (ref 7–12)
POTASSIUM SERPL-SCNC: 3.8 MMOL/L (ref 3.5–5)
PROT SERPL-MCNC: 6.8 G/DL (ref 6.4–8.3)
RBC # BLD AUTO: 4.48 E12/L (ref 3.5–5.5)
SODIUM SERPL-SCNC: 138 MMOL/L (ref 132–146)
WBC # BLD: 9.3 E9/L (ref 4.5–11.5)

## 2023-04-04 PROCEDURE — 7100000000 HC PACU RECOVERY - FIRST 15 MIN: Performed by: STUDENT IN AN ORGANIZED HEALTH CARE EDUCATION/TRAINING PROGRAM

## 2023-04-04 PROCEDURE — 0FT44ZZ RESECTION OF GALLBLADDER, PERCUTANEOUS ENDOSCOPIC APPROACH: ICD-10-PCS | Performed by: STUDENT IN AN ORGANIZED HEALTH CARE EDUCATION/TRAINING PROGRAM

## 2023-04-04 PROCEDURE — 3700000001 HC ADD 15 MINUTES (ANESTHESIA): Performed by: STUDENT IN AN ORGANIZED HEALTH CARE EDUCATION/TRAINING PROGRAM

## 2023-04-04 PROCEDURE — 7100000001 HC PACU RECOVERY - ADDTL 15 MIN: Performed by: STUDENT IN AN ORGANIZED HEALTH CARE EDUCATION/TRAINING PROGRAM

## 2023-04-04 PROCEDURE — 3700000000 HC ANESTHESIA ATTENDED CARE: Performed by: STUDENT IN AN ORGANIZED HEALTH CARE EDUCATION/TRAINING PROGRAM

## 2023-04-04 PROCEDURE — 6360000002 HC RX W HCPCS

## 2023-04-04 PROCEDURE — 2709999900 HC NON-CHARGEABLE SUPPLY: Performed by: STUDENT IN AN ORGANIZED HEALTH CARE EDUCATION/TRAINING PROGRAM

## 2023-04-04 PROCEDURE — 80053 COMPREHEN METABOLIC PANEL: CPT

## 2023-04-04 PROCEDURE — 2720000010 HC SURG SUPPLY STERILE: Performed by: STUDENT IN AN ORGANIZED HEALTH CARE EDUCATION/TRAINING PROGRAM

## 2023-04-04 PROCEDURE — 6370000000 HC RX 637 (ALT 250 FOR IP): Performed by: ANESTHESIOLOGY

## 2023-04-04 PROCEDURE — 85027 COMPLETE CBC AUTOMATED: CPT

## 2023-04-04 PROCEDURE — 88304 TISSUE EXAM BY PATHOLOGIST: CPT

## 2023-04-04 PROCEDURE — 3209999900 FLUORO FOR SURGICAL PROCEDURES

## 2023-04-04 PROCEDURE — 2580000003 HC RX 258: Performed by: NURSE ANESTHETIST, CERTIFIED REGISTERED

## 2023-04-04 PROCEDURE — 99232 SBSQ HOSP IP/OBS MODERATE 35: CPT | Performed by: INTERNAL MEDICINE

## 2023-04-04 PROCEDURE — 8E0W4CZ ROBOTIC ASSISTED PROCEDURE OF TRUNK REGION, PERCUTANEOUS ENDOSCOPIC APPROACH: ICD-10-PCS | Performed by: STUDENT IN AN ORGANIZED HEALTH CARE EDUCATION/TRAINING PROGRAM

## 2023-04-04 PROCEDURE — 6360000002 HC RX W HCPCS: Performed by: ANESTHESIOLOGY

## 2023-04-04 PROCEDURE — 3600000009 HC SURGERY ROBOT BASE: Performed by: STUDENT IN AN ORGANIZED HEALTH CARE EDUCATION/TRAINING PROGRAM

## 2023-04-04 PROCEDURE — 3600000019 HC SURGERY ROBOT ADDTL 15MIN: Performed by: STUDENT IN AN ORGANIZED HEALTH CARE EDUCATION/TRAINING PROGRAM

## 2023-04-04 PROCEDURE — 2500000003 HC RX 250 WO HCPCS: Performed by: STUDENT IN AN ORGANIZED HEALTH CARE EDUCATION/TRAINING PROGRAM

## 2023-04-04 PROCEDURE — 2500000003 HC RX 250 WO HCPCS

## 2023-04-04 PROCEDURE — 6360000002 HC RX W HCPCS: Performed by: NURSE ANESTHETIST, CERTIFIED REGISTERED

## 2023-04-04 PROCEDURE — 2500000003 HC RX 250 WO HCPCS: Performed by: NURSE ANESTHETIST, CERTIFIED REGISTERED

## 2023-04-04 PROCEDURE — 2060000000 HC ICU INTERMEDIATE R&B

## 2023-04-04 PROCEDURE — 6370000000 HC RX 637 (ALT 250 FOR IP)

## 2023-04-04 PROCEDURE — 36415 COLL VENOUS BLD VENIPUNCTURE: CPT

## 2023-04-04 PROCEDURE — S2900 ROBOTIC SURGICAL SYSTEM: HCPCS | Performed by: STUDENT IN AN ORGANIZED HEALTH CARE EDUCATION/TRAINING PROGRAM

## 2023-04-04 PROCEDURE — 2580000003 HC RX 258

## 2023-04-04 DEVICE — HEMOLOK ML 6 CLIPS/CART
Type: IMPLANTABLE DEVICE | Site: ABDOMEN | Status: FUNCTIONAL
Brand: WECK

## 2023-04-04 RX ORDER — SODIUM CHLORIDE 0.9 % (FLUSH) 0.9 %
5-40 SYRINGE (ML) INJECTION PRN
Status: DISCONTINUED | OUTPATIENT
Start: 2023-04-04 | End: 2023-04-04 | Stop reason: HOSPADM

## 2023-04-04 RX ORDER — ONDANSETRON 2 MG/ML
INJECTION INTRAMUSCULAR; INTRAVENOUS PRN
Status: DISCONTINUED | OUTPATIENT
Start: 2023-04-04 | End: 2023-04-04 | Stop reason: SDUPTHER

## 2023-04-04 RX ORDER — SODIUM CHLORIDE 9 MG/ML
INJECTION, SOLUTION INTRAVENOUS PRN
Status: DISCONTINUED | OUTPATIENT
Start: 2023-04-04 | End: 2023-04-04 | Stop reason: HOSPADM

## 2023-04-04 RX ORDER — MIDAZOLAM HYDROCHLORIDE 1 MG/ML
INJECTION INTRAMUSCULAR; INTRAVENOUS PRN
Status: DISCONTINUED | OUTPATIENT
Start: 2023-04-04 | End: 2023-04-04 | Stop reason: SDUPTHER

## 2023-04-04 RX ORDER — SODIUM CHLORIDE 0.9 % (FLUSH) 0.9 %
5-40 SYRINGE (ML) INJECTION EVERY 12 HOURS SCHEDULED
Status: DISCONTINUED | OUTPATIENT
Start: 2023-04-04 | End: 2023-04-04 | Stop reason: HOSPADM

## 2023-04-04 RX ORDER — DEXAMETHASONE SODIUM PHOSPHATE 4 MG/ML
INJECTION, SOLUTION INTRA-ARTICULAR; INTRALESIONAL; INTRAMUSCULAR; INTRAVENOUS; SOFT TISSUE PRN
Status: DISCONTINUED | OUTPATIENT
Start: 2023-04-04 | End: 2023-04-04 | Stop reason: SDUPTHER

## 2023-04-04 RX ORDER — NEOSTIGMINE METHYLSULFATE 1 MG/ML
INJECTION, SOLUTION INTRAVENOUS PRN
Status: DISCONTINUED | OUTPATIENT
Start: 2023-04-04 | End: 2023-04-04 | Stop reason: SDUPTHER

## 2023-04-04 RX ORDER — PROCHLORPERAZINE EDISYLATE 5 MG/ML
5 INJECTION INTRAMUSCULAR; INTRAVENOUS
Status: COMPLETED | OUTPATIENT
Start: 2023-04-04 | End: 2023-04-04

## 2023-04-04 RX ORDER — ROCURONIUM BROMIDE 10 MG/ML
INJECTION, SOLUTION INTRAVENOUS PRN
Status: DISCONTINUED | OUTPATIENT
Start: 2023-04-04 | End: 2023-04-04 | Stop reason: SDUPTHER

## 2023-04-04 RX ORDER — GLYCOPYRROLATE 1 MG/5 ML
SYRINGE (ML) INTRAVENOUS PRN
Status: DISCONTINUED | OUTPATIENT
Start: 2023-04-04 | End: 2023-04-04 | Stop reason: SDUPTHER

## 2023-04-04 RX ORDER — SODIUM CHLORIDE 9 MG/ML
INJECTION, SOLUTION INTRAVENOUS CONTINUOUS PRN
Status: DISCONTINUED | OUTPATIENT
Start: 2023-04-04 | End: 2023-04-04 | Stop reason: SDUPTHER

## 2023-04-04 RX ORDER — PROPOFOL 10 MG/ML
INJECTION, EMULSION INTRAVENOUS PRN
Status: DISCONTINUED | OUTPATIENT
Start: 2023-04-04 | End: 2023-04-04 | Stop reason: SDUPTHER

## 2023-04-04 RX ORDER — BUPIVACAINE HYDROCHLORIDE AND EPINEPHRINE 2.5; 5 MG/ML; UG/ML
INJECTION, SOLUTION EPIDURAL; INFILTRATION; INTRACAUDAL; PERINEURAL PRN
Status: DISCONTINUED | OUTPATIENT
Start: 2023-04-04 | End: 2023-04-04 | Stop reason: ALTCHOICE

## 2023-04-04 RX ORDER — FENTANYL CITRATE 50 UG/ML
INJECTION, SOLUTION INTRAMUSCULAR; INTRAVENOUS PRN
Status: DISCONTINUED | OUTPATIENT
Start: 2023-04-04 | End: 2023-04-04 | Stop reason: SDUPTHER

## 2023-04-04 RX ADMIN — HYDROMORPHONE HYDROCHLORIDE 0.5 MG: 0.5 INJECTION, SOLUTION INTRAMUSCULAR; INTRAVENOUS; SUBCUTANEOUS at 12:56

## 2023-04-04 RX ADMIN — MORPHINE SULFATE 2 MG: 2 INJECTION, SOLUTION INTRAMUSCULAR; INTRAVENOUS at 23:05

## 2023-04-04 RX ADMIN — SODIUM CHLORIDE: 9 INJECTION, SOLUTION INTRAVENOUS at 11:30

## 2023-04-04 RX ADMIN — PROPOFOL 100 MG: 10 INJECTION, EMULSION INTRAVENOUS at 11:35

## 2023-04-04 RX ADMIN — METRONIDAZOLE 500 MG: 500 INJECTION, SOLUTION INTRAVENOUS at 07:28

## 2023-04-04 RX ADMIN — WATER 2000 MG: 1 INJECTION INTRAMUSCULAR; INTRAVENOUS; SUBCUTANEOUS at 23:06

## 2023-04-04 RX ADMIN — METRONIDAZOLE 500 MG: 500 INJECTION, SOLUTION INTRAVENOUS at 15:42

## 2023-04-04 RX ADMIN — ONDANSETRON 4 MG: 2 INJECTION INTRAMUSCULAR; INTRAVENOUS at 11:39

## 2023-04-04 RX ADMIN — HYDROMORPHONE HYDROCHLORIDE 0.5 MG: 0.5 INJECTION, SOLUTION INTRAMUSCULAR; INTRAVENOUS; SUBCUTANEOUS at 13:10

## 2023-04-04 RX ADMIN — PROCHLORPERAZINE EDISYLATE 5 MG: 5 INJECTION INTRAMUSCULAR; INTRAVENOUS at 13:17

## 2023-04-04 RX ADMIN — INDOCYANINE GREEN AND WATER 5 MG: KIT at 10:18

## 2023-04-04 RX ADMIN — ROCURONIUM BROMIDE 40 MG: 10 INJECTION, SOLUTION INTRAVENOUS at 11:35

## 2023-04-04 RX ADMIN — FENTANYL CITRATE 100 MCG: 50 INJECTION, SOLUTION INTRAMUSCULAR; INTRAVENOUS at 11:30

## 2023-04-04 RX ADMIN — DEXAMETHASONE SODIUM PHOSPHATE 10 MG: 4 INJECTION, SOLUTION INTRAMUSCULAR; INTRAVENOUS at 11:39

## 2023-04-04 RX ADMIN — METRONIDAZOLE 500 MG: 500 INJECTION, SOLUTION INTRAVENOUS at 23:21

## 2023-04-04 RX ADMIN — Medication 0.4 MG: at 12:40

## 2023-04-04 RX ADMIN — HYDROMORPHONE HYDROCHLORIDE 0.5 MG: 0.5 INJECTION, SOLUTION INTRAMUSCULAR; INTRAVENOUS; SUBCUTANEOUS at 13:01

## 2023-04-04 RX ADMIN — MIDAZOLAM 2 MG: 1 INJECTION INTRAMUSCULAR; INTRAVENOUS at 11:30

## 2023-04-04 RX ADMIN — MORPHINE SULFATE 2 MG: 2 INJECTION, SOLUTION INTRAMUSCULAR; INTRAVENOUS at 15:38

## 2023-04-04 RX ADMIN — MORPHINE SULFATE 2 MG: 2 INJECTION, SOLUTION INTRAMUSCULAR; INTRAVENOUS at 20:07

## 2023-04-04 RX ADMIN — Medication 3 MG: at 12:40

## 2023-04-04 RX ADMIN — LIDOCAINE HYDROCHLORIDE: 20 SOLUTION ORAL; TOPICAL at 14:11

## 2023-04-04 RX ADMIN — ONDANSETRON 4 MG: 2 INJECTION INTRAMUSCULAR; INTRAVENOUS at 20:22

## 2023-04-04 ASSESSMENT — PAIN DESCRIPTION - ORIENTATION
ORIENTATION: UPPER
ORIENTATION: MID
ORIENTATION: RIGHT
ORIENTATION: MID;UPPER
ORIENTATION: MID
ORIENTATION: MID
ORIENTATION: UPPER
ORIENTATION: MID;RIGHT;LEFT
ORIENTATION: UPPER
ORIENTATION: RIGHT;MID
ORIENTATION: MID;RIGHT;LOWER

## 2023-04-04 ASSESSMENT — PAIN DESCRIPTION - PAIN TYPE
TYPE: SURGICAL PAIN
TYPE: ACUTE PAIN

## 2023-04-04 ASSESSMENT — PAIN DESCRIPTION - ONSET
ONSET: GRADUAL
ONSET: ON-GOING
ONSET: GRADUAL
ONSET: ON-GOING

## 2023-04-04 ASSESSMENT — PAIN DESCRIPTION - LOCATION
LOCATION: ABDOMEN
LOCATION: ABDOMEN;CHEST
LOCATION: ABDOMEN

## 2023-04-04 ASSESSMENT — PAIN SCALES - GENERAL
PAINLEVEL_OUTOF10: 9
PAINLEVEL_OUTOF10: 4
PAINLEVEL_OUTOF10: 7
PAINLEVEL_OUTOF10: 8
PAINLEVEL_OUTOF10: 9
PAINLEVEL_OUTOF10: 4
PAINLEVEL_OUTOF10: 7
PAINLEVEL_OUTOF10: 3
PAINLEVEL_OUTOF10: 9
PAINLEVEL_OUTOF10: 7

## 2023-04-04 ASSESSMENT — PAIN DESCRIPTION - FREQUENCY
FREQUENCY: CONTINUOUS
FREQUENCY: INTERMITTENT
FREQUENCY: CONTINUOUS
FREQUENCY: CONTINUOUS
FREQUENCY: INTERMITTENT
FREQUENCY: CONTINUOUS

## 2023-04-04 ASSESSMENT — PAIN - FUNCTIONAL ASSESSMENT
PAIN_FUNCTIONAL_ASSESSMENT: PREVENTS OR INTERFERES SOME ACTIVE ACTIVITIES AND ADLS
PAIN_FUNCTIONAL_ASSESSMENT: PREVENTS OR INTERFERES SOME ACTIVE ACTIVITIES AND ADLS

## 2023-04-04 ASSESSMENT — PAIN DESCRIPTION - DESCRIPTORS
DESCRIPTORS: SHARP;PRESSURE
DESCRIPTORS: ACHING;DISCOMFORT;SHARP
DESCRIPTORS: ACHING;DISCOMFORT;TENDER
DESCRIPTORS: PRESSURE;SHARP
DESCRIPTORS: DISCOMFORT;CRAMPING;SORE
DESCRIPTORS: SHARP;PRESSURE
DESCRIPTORS: DISCOMFORT
DESCRIPTORS: SHARP;PRESSURE
DESCRIPTORS: SHARP;PRESSURE
DESCRIPTORS: PRESSURE;SHARP
DESCRIPTORS: ACHING;DISCOMFORT;SORE

## 2023-04-04 NOTE — CARE COORDINATION
Transition of Care-Sissy Ramos today-hopeful discharge home within the next 24 hrs, patient is independent, does not anticipate any discharge needs.     Electronically signed by Dereje Reid RN on 4/4/2023 at 9:53 AM

## 2023-04-04 NOTE — ANESTHESIA POSTPROCEDURE EVALUATION
Department of Anesthesiology  Postprocedure Note    Patient: Susan Gallegos  MRN: [id number]  YOB: 1965  Date of evaluation: 4/4/2023      Procedure Summary     Date: 04/04/23 Room / Location: [facility] 09 / [address] [facility]    Anesthesia Start: 1130 Anesthesia Stop: 1254    Procedure: LAPAROSCOPIC ROBOTIC XI ASSISTED CHOLECYSTECTOMY WITH GRAM (Abdomen) Diagnosis:       Obstruction of bile duct      (Obstruction of bile duct [K83.1])    Surgeons: Yanira Rocha DO Responsible Provider: Reene Johnson MD    Anesthesia Type: General ASA Status: 3          Anesthesia Type: General    Cira Phase I: Cira Score: 10    Cira Phase II:        Anesthesia Post Evaluation    Patient location during evaluation: PACU  Patient participation: complete - patient participated  Level of consciousness: awake and alert  Pain score: 3  Airway patency: patent  Nausea & Vomiting: no vomiting and no nausea  Complications: no  Cardiovascular status: hemodynamically stable  Respiratory status: spontaneous ventilation  Hydration status: stable

## 2023-04-04 NOTE — OP NOTE
pneumoperitoneum released. The skin was wiped clean. Local anesthetic was infiltrated to incisions. The ports were removed under direct visualization and the skin was then closed with 4-0 Monocryl sutures. Skin glue was applied to the incisions. Needle, sponge, and instrument counts were reported as correct x2. The patient tolerated the procedure well without complications.       Dr. Mellisa Rascon was present and scrubbed throughout the case    Tay Asher MD  04/04/23  12:56 PM

## 2023-04-04 NOTE — PLAN OF CARE
Problem: Discharge Planning  Goal: Discharge to home or other facility with appropriate resources  Outcome: Progressing  Flowsheets (Taken 4/3/2023 2332 by Brenden Peoples RN)  Discharge to home or other facility with appropriate resources:   Identify barriers to discharge with patient and caregiver   Arrange for needed discharge resources and transportation as appropriate     Problem: Pain  Goal: Verbalizes/displays adequate comfort level or baseline comfort level  Outcome: Progressing     Problem: Safety - Adult  Goal: Free from fall injury  Outcome: Progressing     Problem: ABCDS Injury Assessment  Goal: Absence of physical injury  Outcome: Progressing

## 2023-04-05 VITALS
RESPIRATION RATE: 18 BRPM | WEIGHT: 114.06 LBS | SYSTOLIC BLOOD PRESSURE: 108 MMHG | HEART RATE: 65 BPM | BODY MASS INDEX: 23 KG/M2 | TEMPERATURE: 98.1 F | HEIGHT: 59 IN | DIASTOLIC BLOOD PRESSURE: 57 MMHG | OXYGEN SATURATION: 97 %

## 2023-04-05 LAB
ALBUMIN SERPL-MCNC: 3.2 G/DL (ref 3.5–5.2)
ALP SERPL-CCNC: 119 U/L (ref 35–104)
ALT SERPL-CCNC: 223 U/L (ref 0–32)
ANION GAP SERPL CALCULATED.3IONS-SCNC: 10 MMOL/L (ref 7–16)
AST SERPL-CCNC: 124 U/L (ref 0–31)
BILIRUB SERPL-MCNC: 0.4 MG/DL (ref 0–1.2)
BUN SERPL-MCNC: 9 MG/DL (ref 6–20)
CALCIUM SERPL-MCNC: 8.3 MG/DL (ref 8.6–10.2)
CHLORIDE SERPL-SCNC: 106 MMOL/L (ref 98–107)
CO2 SERPL-SCNC: 21 MMOL/L (ref 22–29)
CREAT SERPL-MCNC: 0.6 MG/DL (ref 0.5–1)
EKG ATRIAL RATE: 63 BPM
EKG P AXIS: 59 DEGREES
EKG P-R INTERVAL: 142 MS
EKG Q-T INTERVAL: 442 MS
EKG QRS DURATION: 78 MS
EKG QTC CALCULATION (BAZETT): 452 MS
EKG R AXIS: 46 DEGREES
EKG T AXIS: 80 DEGREES
EKG VENTRICULAR RATE: 63 BPM
ERYTHROCYTE [DISTWIDTH] IN BLOOD BY AUTOMATED COUNT: 14 FL (ref 11.5–15)
GLUCOSE SERPL-MCNC: 85 MG/DL (ref 74–99)
HCT VFR BLD AUTO: 34.8 % (ref 34–48)
HGB BLD-MCNC: 11.7 G/DL (ref 11.5–15.5)
MCH RBC QN AUTO: 28.7 PG (ref 26–35)
MCHC RBC AUTO-ENTMCNC: 33.6 % (ref 32–34.5)
MCV RBC AUTO: 85.3 FL (ref 80–99.9)
PLATELET # BLD AUTO: 225 E9/L (ref 130–450)
PMV BLD AUTO: 12.6 FL (ref 7–12)
POTASSIUM SERPL-SCNC: 3.8 MMOL/L (ref 3.5–5)
PROT SERPL-MCNC: 6 G/DL (ref 6.4–8.3)
RBC # BLD AUTO: 4.08 E12/L (ref 3.5–5.5)
SODIUM SERPL-SCNC: 137 MMOL/L (ref 132–146)
WBC # BLD: 15.1 E9/L (ref 4.5–11.5)

## 2023-04-05 PROCEDURE — 36415 COLL VENOUS BLD VENIPUNCTURE: CPT

## 2023-04-05 PROCEDURE — 99239 HOSP IP/OBS DSCHRG MGMT >30: CPT | Performed by: INTERNAL MEDICINE

## 2023-04-05 PROCEDURE — 85027 COMPLETE CBC AUTOMATED: CPT

## 2023-04-05 PROCEDURE — 6370000000 HC RX 637 (ALT 250 FOR IP)

## 2023-04-05 PROCEDURE — 80053 COMPREHEN METABOLIC PANEL: CPT

## 2023-04-05 PROCEDURE — 6360000002 HC RX W HCPCS

## 2023-04-05 RX ORDER — OXYCODONE HYDROCHLORIDE AND ACETAMINOPHEN 5; 325 MG/1; MG/1
1 TABLET ORAL EVERY 4 HOURS PRN
Status: DISCONTINUED | OUTPATIENT
Start: 2023-04-05 | End: 2023-04-05 | Stop reason: HOSPADM

## 2023-04-05 RX ORDER — OXYCODONE HYDROCHLORIDE AND ACETAMINOPHEN 5; 325 MG/1; MG/1
1 TABLET ORAL EVERY 6 HOURS PRN
Qty: 12 TABLET | Refills: 0 | Status: SHIPPED | OUTPATIENT
Start: 2023-04-05 | End: 2023-04-08

## 2023-04-05 RX ADMIN — OXYCODONE HYDROCHLORIDE AND ACETAMINOPHEN 1 TABLET: 5; 325 TABLET ORAL at 06:51

## 2023-04-05 RX ADMIN — DOCUSATE SODIUM 200 MG: 100 CAPSULE ORAL at 08:37

## 2023-04-05 RX ADMIN — MORPHINE SULFATE 2 MG: 2 INJECTION, SOLUTION INTRAMUSCULAR; INTRAVENOUS at 02:10

## 2023-04-05 ASSESSMENT — PAIN DESCRIPTION - LOCATION: LOCATION: ABDOMEN

## 2023-04-05 ASSESSMENT — PAIN SCALES - GENERAL
PAINLEVEL_OUTOF10: 0
PAINLEVEL_OUTOF10: 4

## 2023-04-05 ASSESSMENT — PAIN DESCRIPTION - DESCRIPTORS: DESCRIPTORS: SORE;NAGGING;DISCOMFORT

## 2023-04-05 ASSESSMENT — PAIN - FUNCTIONAL ASSESSMENT: PAIN_FUNCTIONAL_ASSESSMENT: PREVENTS OR INTERFERES SOME ACTIVE ACTIVITIES AND ADLS

## 2023-04-05 ASSESSMENT — PAIN DESCRIPTION - ORIENTATION: ORIENTATION: MID;LEFT

## 2023-04-05 NOTE — PROGRESS NOTES
[facility] Progress Note    --------------------------------------------------------------------------------------  Assessment and Plan  Abdominal pain suspect due to acute cholecystitis with elevated LFTs - MRCP did not appear to show evidence of choledocholithiasis. For OR tomorrow, now with Dr. Ella Schreiber. There was initially some concern for MI, cardiology does not feel this is the case, echocardiogram essentially unremarkable. Continues on Rocephin and Flagyl as antibiotic prophylaxis in the setting of cholecystitis. Dysuria - patient reports some burning with urination over the past day. Can check a repeat urinalysis, but has been on Rocephin for several days and doubt UTI and less this is a resistant organism. Had reported hematuria on presentation but this was not present in the first UA. Code status          Full Code  DVT prophylaxis           SCDs  --------------------------------------------------------------------------------------    Admission Date  3/30/2023  6:29 PM  Chief Complaint Chest pain, abd pain, hematuria    Subjective  History of Present Illness  Seen at bedside, pt much more satisfied this AM now that she is for OR tomorrow. Still w some discomfort that is relatively well-controlled. She has no new issues to address today, just waiting for OR. Discussed w nursing.       Review of Systems - 12-point review of systems has been reviewed and is otherwise negative except as listed in the HPI    Objective  Physical Exam  Vitals: BP 99/61   Pulse 60   Temp (!) 96.6 °F (35.9 °C) (Oral)   Resp 17   Ht 4' 11\" (1.499 m)   Wt 110 lb 11.2 oz (50.2 kg)   SpO2 95%   BMI 22.36 kg/m²   General: well-developed, well-nourished, no acute distress, cooperative  Skin: generally warm, dry, and intact, with normal color  HEENT: normocephalic, atraumatic, no gross abnormalities  Respiratory: clear to auscultation bilaterally without respiratory distress  Cardiovascular:
CLINICAL PHARMACY NOTE: MEDS TO BEDS    Total # of Prescriptions Filled: 1   The following medications were delivered to the patient:  Percocet 5-325 mg    Additional Documentation:
Dr. Emilia Echevarria contacted and agreeable to accept the consult at this time.
[facility] Quality Flow/Interdisciplinary Rounds Progress Note        Quality Flow Rounds held on April 3, 2023    Disciplines Attending:  Bedside Nurse, , , and Nursing Unit Leadership    Evelina Card was admitted on 3/30/2023  6:29 PM    Anticipated Discharge Date:       Disposition:    Hossein Score:  Hossein Scale Score: 23    Readmission Risk              Risk of Unplanned Readmission:  7           Discussed patient goal for the day, patient clinical progression, and barriers to discharge. The following Goal(s) of the Day/Commitment(s) have been identified: Continue rocephin and IV Flagyl, monitor labs, for cholecystectomy 4/4, and discharge planning home.       Luke Borrego, VÍCTOR  April 3, 2023
GENERAL SURGERY  DAILY PROGRESS NOTE  4/4/2023    CHIEF COMPLAINT:  Chief Complaint   Patient presents with    Chest Pain     Mid chest pain aching since this morning, feels like she's been punched in the chest     Abdominal Pain     Epigastric pain x 2 days, after eats fees like a bomb went off in her stomach     Hematuria     States urine was orange today        SUBJECTIVE:  Feeling well today, pain about the same, no nausea or vomiting. Labs within normal limits today     OBJECTIVE:  BP (!) 97/50   Pulse 61   Temp 98 °F (36.7 °C) (Oral)   Resp 18   Ht 4' 11\" (1.499 m)   Wt 114 lb 1 oz (51.7 kg)   SpO2 95%   BMI 23.04 kg/m²     GENERAL:  NAD. A&Ox3. LUNGS:  No increased work of breathing. CARDIOVASCULAR: RR  ABDOMEN:  Soft, non-distended, RUQ tender. No guarding, rigidity, rebound.     ASSESSMENT/PLAN:  56 y.o. female with acute cholecystitis      Continue abx   Plan for cholecystectomy w/ IOC 4/4  NPO, IVF  Type and screen   Pain and nausea control as needed  Monitor bili, wbc     Gracie Richardson MD  Surgery Resident PGY-2  4/4/2023  7:18 AM
GENERAL SURGERY  DAILY PROGRESS NOTE  4/5/2023    CHIEF COMPLAINT:  Chief Complaint   Patient presents with    Chest Pain     Mid chest pain aching since this morning, feels like she's been punched in the chest     Abdominal Pain     Epigastric pain x 2 days, after eats fees like a bomb went off in her stomach     Hematuria     States urine was orange today        SUBJECTIVE:  Looks good this morning, pain controlled no nausea or vomiting. Tolerating diet, did not require IV pain meds overnight     OBJECTIVE:  /64   Pulse 60   Temp 98.3 °F (36.8 °C) (Oral)   Resp 18   Ht 4' 11\" (1.499 m)   Wt 114 lb 1 oz (51.7 kg)   SpO2 98%   BMI 23.04 kg/m²     GENERAL:  NAD. A&Ox3. LUNGS:  No increased work of breathing. CARDIOVASCULAR: RR  ABDOMEN:  Soft, non-distended, appropriately tender incisions cdit. No guarding, rigidity, rebound.     ASSESSMENT/PLAN:  56 y.o. female with cholecystitis s/p lap pierce and negative IOC 4/4    Diet as tolerated   Pain and nausea control as needed   Ambulate   Likely discharge if tolerating breakfast narendra Woowdard MD  Surgery Resident PGY-2  4/5/2023  7:22 AM
[facility] Progress Note    --------------------------------------------------------------------------------------  Assessment and Plan  Abdominal pain suspect due to acute cholecystitis with elevated LFTs - imaging consistent with acute cholecystitis as well as CBD dilation, for MRCP. Transaminases, alkaline phosphatase, and bilirubin are elevated, follow CMP for resolution. NPO, IVF, analgesics prn. Surgery consulted, cholecystectomy timing is to be determined and at least partially based on MRCP findings. May need ERCP as well, GI has been consulted. On Rocephin and Flagyl as ordered by surgery, though patient is without fever, leukocytosis, or elevation in procalcitonin; not convinced these are warranted at this time    Reported hematuria - no blood noted on urinalysis here. Continue to monitor    Code status          Full Code  DVT prophylaxis           SCDs  --------------------------------------------------------------------------------------    Admission Date  3/30/2023  6:29 PM  Chief Complaint Chest pain, abd pain, hematuria    Subjective  History of Present Illness  56 y.o. female w no known PMH who presented to ED 3/30/2023 from home with complaints of chest pain, epigastric abdominal pain, and hematuria. Initial evaluation in the ED showed pt was hemodynamically stable. Workup showed significant elevations in transaminases as well as elevations in alkaline phosphatase and bilirubin with predominantly direct hyperbilirubinemia. Lipase was unremarkable. There was no leukocytosis or elevation in procalcitonin. Imaging studies showed emphysema with bronchitis but no infiltrates on chest x-ray and evidence of acute cholecystitis on CT and ultrasound with note of CBD dilation at 6.1 mm. EKG nonischemic with troponin undetectable. Urinalysis was negative for blood.   Pt was given Rocephin, Flagyl, morphine, Zofran in the ED and admitted for further evaluation and treatment with
INPATIENT CARDIOLOGY CONSULT follow-up visit     Reason for Consult: Cardiac risk stratification prior to cholecystectomy    Cardiologist: Dr. Soto Brice    Requesting Physician: Dr. Renaldo Deshpande    Date of Consultation: 4/2/2023    Interim:  I spoke with patient at length and she reported generally very active able to go up steps multiple times up and down without any chest pain or dyspnea on exertion and is able to achieve more than 4 METS without any symptoms or limitations. She may proceed to surgery without additional cardiac testing. Her revised cardiac risk score of possible 30 days adverse cardiovascular outcome is 0.9%. Transthoracic echocardiogram April 1, 2023   Summary   Ejection fraction is visually estimated at 60 to 65%. Mild asymmetric septal hypertrophy. Left ventricular diastolic filling is elevated . Normal right ventricular size and function. Mild mitral regurgitation is present. Mild tricuspid regurgitation. RVSP is 26 mmHg. PAST MEDICAL HISTORY:    COPD with continued tobacco abuse  Pulmonary nodules      CARDIAC TESTING    TTE, [facility] 5/2007  LEFT VENTRICLE - The left ventricle is normal in size and systolic function. EF = 50 +/- 5%. The left ventricle appears  normal in size. Left   ventricular systolic function is low normal.  Apical views somewhat   foreshortened. RIGHT VENTRICLE - The right ventricle is normal in size and systolic   function. LEFT ATRIUM/PULMONARY VEINS - The left atrium is normal.  The left atrial   cavity appears normal in size. The left atrial cavity area is 11.0 cm?   during systole. RIGHT ATRIUM/IVC/SVC - The right atrium is normal.   MITRAL VALVE - There is trivial mitral regurgitation. The pulmonary venous   pattern showed normal systolic flow. TRICUSPID VALVE -  There is trivial tricuspid regurgitation. Regurgitant   velocity is 227.0 cm/s and estimated RV systolic pressure is 25 mm Hg.    AORTIC VALVE/LVOT -  There is no aortic
Instructed patient on preop bathing and chlorohexidine wipes as well as removal of jewelry.
[facility] Progress Note    --------------------------------------------------------------------------------------  Assessment and Plan  Abdominal pain suspect due to acute cholecystitis with elevated LFTs - MRCP did not appear to show evidence of choledocholithiasis. For OR today with Dr. Fernandez Failing. No evidence of MI. Continues on Rocephin and Flagyl as antibiotic prophylaxis in the setting of cholecystitis. Plan to dc when ok w surgery. Dysuria - patient reports some burning with urination on 4/3, UA at the time unremarkable    Code status          Full Code  DVT prophylaxis           SCDs  --------------------------------------------------------------------------------------    Admission Date  3/30/2023  6:29 PM  Chief Complaint Chest pain, abd pain, hematuria    Subjective  History of Present Illness  Seen at bedside in her room this morning just of transportation was coming to bring her down for OR. She said she is getting a bit anxious over the surgery but otherwise has no new issues today. She wonders when she will be able to be discharged, I explained I would generally defer to the surgical team.  If they are okay with discharge later today or if they would prefer tomorrow, I would most likely agree with them either way.     Review of Systems - 12-point review of systems has been reviewed and is otherwise negative except as listed in the HPI    Objective  Physical Exam  Vitals: BP (!) 93/36   Pulse 68   Temp (!) 96.3 °F (35.7 °C) (Oral)   Resp 18   Ht 4' 11\" (1.499 m)   Wt 114 lb 1 oz (51.7 kg)   SpO2 93%   BMI 23.04 kg/m²   General: well-developed, well-nourished, no acute distress, cooperative  Skin: generally warm, dry, and intact, with normal color  HEENT: normocephalic, atraumatic, no gross abnormalities  Respiratory: clear to auscultation bilaterally without respiratory distress  Cardiovascular: regular rate and rhythm without murmur / rub / gallop  Abdominal: soft,
New consult called to Dr Muriel Singleton office.
New consult sent to [facility] via perfect Serve message.
Notified anesthesia of NSR EKG, states she will be by to evaluate.
Nursing Transfer Note    Data:  Summary of patients progress: Dr Lisseth pelayo  Reason for transfer: next level of care    Action:  Explained reason for transfer to Patient. Report given to: Romina Avendano, using RN Handoff Navigator.   Mode of transportation: bed    Response:  RN Recommendations: pain mgt
PROGRESS NOTE        Patient Presents with/Seen in Consultation For      *Reason for Consult: ercp/choledocholithiasis  CHIEF COMPLAINT:  abdominal pain   Subjective:     Patient seen walking around room, states she feels better today. Using PRN pain medication with relief. No nausea. Tolerating diet. MRI reviewed with patient. POC d/w pt. Review of Systems  Aside from what was mentioned in the PMH and HPI, essentially unremarkable, all others negative. Objective:     BP 97/79   Pulse 72   Temp 98.2 °F (36.8 °C) (Oral)   Resp 18   Ht 4' 11\" (1.499 m)   Wt 110 lb 11.2 oz (50.2 kg)   SpO2 96%   BMI 22.36 kg/m²     General appearance: alert, awake,  walking around room, and cooperative  Eyes: conjunctiva pale, sclera anicteric. PERRL.   Lungs: clear to auscultation bilaterally  Heart: regular rate and rhythm, no murmur, 2+ pulses; no edema  Abdomen: soft, tender to palpation without guarding or rebound; bowel sounds normal; no masses,  no organomegaly  Extremities: extremities without edema  Pulses: 2+ and symmetric  Skin: Skin color, texture, turgor normal.   Neurologic: Grossly normal    perflutren lipid microspheres (DEFINITY) injection 1.5 mL, ONCE PRN  morphine (PF) injection 2 mg, Q3H PRN  ondansetron (ZOFRAN) injection 4 mg, Q6H PRN  nicotine (NICODERM CQ) 21 MG/24HR 1 patch, Daily  cefTRIAXone (ROCEPHIN) 2,000 mg in sterile water 20 mL IV syringe, Q24H  metronidazole (FLAGYL) 500 mg in 0.9% NaCl 100 mL IVPB premix, Q8H  docusate sodium (COLACE) capsule 200 mg, Daily  bisacodyl (DULCOLAX) EC tablet 5 mg, Daily PRN       Data Review  CBC:   Lab Results   Component Value Date/Time    WBC 7.4 04/01/2023 03:00 AM    RBC 4.46 04/01/2023 03:00 AM    HGB 12.9 04/01/2023 03:00 AM    HCT 38.2 04/01/2023 03:00 AM    MCV 85.7 04/01/2023 03:00 AM    MCH 28.9 04/01/2023 03:00 AM    MCHC 33.8 04/01/2023 03:00 AM    RDW 14.2 04/01/2023 03:00 AM     04/01/2023 03:00 AM    MPV 12.4 04/01/2023 03:00 AM
Perfect Serve to surgery with the following message: Patient tolerated breakfast very well. Okay for discharge from surgery POV? Electronically signed by Betty Lew RN on 4/5/2023 at 9:17 AM    Okay for discharge per surgery.     Electronically signed by Betty Lew RN on 4/5/2023 at 10:27 AM
Pt admitted to PACU 1 from OR. Placed on monitor, RA, VSS.
Pt complaining of \"chest pain\". States it is \"exactly the same\" as when she would have a \"gallbladder attack\". Anesthesia notified, to bed to evaluate and orders new given.
S: patient getting ECHO presently. Tray in front of her with wiped cream, butter, and a chichken pot pie- A low fat diet! Pain well controlled. No nausea, fever, chills  O: VSS afebrile  Abd- unchanged;  no peritoneal findings  Labs reviewed  EKG- ABNORMAL SEPTAL INFARCT AGE INDETERMINATE  IMP:  Acute Cholecystitis           Elevated LFTs           Abnormal CT/US/EKG  Plan:  await further cardiology input;  continue to monitor enzymes. I discussed with Carol Phillips that safety is paramount, and we will await further cardiology recommendations. Her GB does need to be removed, and this may be done electively if  needed.
04/02/2023 02:40 AM     CMP:    Lab Results   Component Value Date/Time     04/02/2023 02:40 AM    K 4.1 04/02/2023 02:40 AM     04/02/2023 02:40 AM    CO2 23 04/02/2023 02:40 AM    BUN 13 04/02/2023 02:40 AM    CREATININE 0.7 04/02/2023 02:40 AM    LABGLOM >60 04/02/2023 02:40 AM    GLUCOSE 94 04/02/2023 02:40 AM    GLUCOSE 125 08/26/2011 04:00 PM    PROT 6.7 04/02/2023 02:40 AM    LABALBU 3.5 04/02/2023 02:40 AM    LABALBU 4.2 08/26/2011 04:00 PM    CALCIUM 9.2 04/02/2023 02:40 AM    BILITOT 0.6 04/02/2023 02:40 AM    ALKPHOS 161 04/02/2023 02:40 AM     04/02/2023 02:40 AM     04/02/2023 02:40 AM     Hepatic Function Panel:    Lab Results   Component Value Date/Time    ALKPHOS 161 04/02/2023 02:40 AM     04/02/2023 02:40 AM     04/02/2023 02:40 AM    PROT 6.7 04/02/2023 02:40 AM    BILITOT 0.6 04/02/2023 02:40 AM    BILIDIR 2.1 03/31/2023 02:15 AM    IBILI 0.5 03/31/2023 02:15 AM    LABALBU 3.5 04/02/2023 02:40 AM    LABALBU 4.2 08/26/2011 04:00 PM     No components found for: CHLPL    Lab Results   Component Value Date    TRIG 124 04/01/2023    TRIG 109 01/04/2011       Lab Results   Component Value Date    HDL 53 04/01/2023    HDL 60.0 01/04/2011       Lab Results   Component Value Date    LDLCALC 148 (H) 04/01/2023    LDLCALC 155 (H) 01/04/2011       Lab Results   Component Value Date    LABVLDL 25 04/01/2023        Assessment:     Active Problems:  Hyperbilirubinemia, resolved  Elevated LFT's  RUQ abdominal pain  Cholelithiasis   Acute cholecystitis- surgery following   [facility] esophageal, pancreatic, colon and gastric cancer  Constipation  Heartburn     Plan:   MRI/MRCP results noted, no indication for ERCP at this time  Lap pierce per surgery   Medicate for pain and nausea per admitting  Elvin Cunningham for diet from GI POV  Discussed with patient, will need endoscopic evaluation screenings as OP given significant [facility]  Colace 200 mg daily  Dulcolax PRN ordered  Supportive care  Will
and is otherwise negative except as listed in the HPI    Objective  Physical Exam  Vitals: BP 97/79   Pulse 72   Temp 98.2 °F (36.8 °C) (Oral)   Resp 18   Ht 4' 11\" (1.499 m)   Wt 110 lb 11.2 oz (50.2 kg)   SpO2 96%   BMI 22.36 kg/m²   General: well-developed, well-nourished, no acute distress, cooperative  Skin: generally warm, dry, and intact, with normal color  HEENT: normocephalic, atraumatic, no gross abnormalities  Respiratory: clear to auscultation bilaterally without respiratory distress  Cardiovascular: regular rate and rhythm without murmur / rub / gallop  Abdominal: soft, nontender, nondistended, normoactive bowel sounds  Extremities: no obvious edema or deformity  Neurologic: awake, alert, no gross deficits  Psychiatric: normal affect, cooperative    Electronically signed by Jhonatan Gu DO on 4/1/2023 at 9:16 AM
normocephalic, atraumatic, no gross abnormalities  Respiratory: clear to auscultation bilaterally without respiratory distress  Cardiovascular: regular rate and rhythm without murmur / rub / gallop  Abdominal: soft, nontender, nondistended, normoactive bowel sounds  Extremities: no obvious edema or deformity  Neurologic: awake, alert, no gross deficits  Psychiatric: normal affect, cooperative    Electronically signed by Emma Escudero DO on 4/2/2023 at 1:38 PM

## 2023-04-05 NOTE — DISCHARGE SUMMARY
Your Medications        These medications were sent to [facility] [address] [place], [address] [place] [address]      Phone: [phone]   oxyCODONE-acetaminophen 5-325 MG per tablet       Note that more than 30 minutes was spent in preparing discharge papers, discussing discharge with patient, medication review, etc.    Electronically signed by Eris Corona DO on 4/5/2023 at 7:42 AM

## 2023-04-05 NOTE — CARE COORDINATION
Transition of Care-Discharge order noted-can go if tolerates breakfast. Returning home, family to transport does not anticipate any discharge needs.     Yenny Valverde BSN, RN  [facility]

## (undated) DEVICE — AGENT HEMSTAT W2XL4IN OXIDIZED REGENERATED CELOS ABSRB

## (undated) DEVICE — BLADELESS OBTURATOR: Brand: WECK VISTA

## (undated) DEVICE — MEDIUM-LARGE CLIP APPLIER: Brand: ENDOWRIST

## (undated) DEVICE — ENDOSCOPIC KIT CLN SWAB MICROFIBER CLTH SCP CLEANOR DISP

## (undated) DEVICE — WARMER SCP 2 ANTIFOG LAP DISP

## (undated) DEVICE — GLOVE ORANGE PI 7   MSG9070

## (undated) DEVICE — NEEDLE CLOSURE OMNICLOSE

## (undated) DEVICE — 4-PORT MANIFOLD: Brand: NEPTUNE 2

## (undated) DEVICE — ADHESIVE SKIN CLSR 0.7ML TOP DERMBND ADV

## (undated) DEVICE — CANNULA SEAL

## (undated) DEVICE — SOLUTION IRRIG 1000ML STRL H2O USP PLAS POUR BTL

## (undated) DEVICE — GOWN,SIRUS,FABRNF,XL,20/CS: Brand: MEDLINE

## (undated) DEVICE — CHLORAPREP 26ML ORANGE

## (undated) DEVICE — TOWEL,OR,DSP,ST,BLUE,STD,6/PK,12PK/CS: Brand: MEDLINE

## (undated) DEVICE — NEEDLE HYPO 25GA L1.5IN BLU POLYPR HUB S STL REG BVL STR

## (undated) DEVICE — KIT,ANTI FOG,W/SPONGE & FLUID,SOFT PACK: Brand: MEDLINE

## (undated) DEVICE — SYRINGE 20ML LL S/C 50

## (undated) DEVICE — FORCE BIPOLAR: Brand: ENDOWRIST

## (undated) DEVICE — C-ARM: Brand: UNBRANDED

## (undated) DEVICE — ARM DRAPE

## (undated) DEVICE — PACK PROCEDURE SURG GEN CUST

## (undated) DEVICE — INSUFFLATION NEEDLE TO ESTABLISH PNEUMOPERITONEUM.: Brand: INSUFFLATION NEEDLE

## (undated) DEVICE — COLUMN DRAPE

## (undated) DEVICE — SUCTION IRRIGATOR: Brand: ENDOWRIST

## (undated) DEVICE — SOLUTION IRRIG 1000ML 0.9% SOD CHL USP POUR PLAS BTL

## (undated) DEVICE — ANCHOR TISSUE RETRIEVAL SYSTEM, BAG SIZE 125 ML, PORT SIZE 8 MM: Brand: ANCHOR TISSUE RETRIEVAL SYSTEM

## (undated) DEVICE — ELECTRODE PT RET AD L9FT HI MOIST COND ADH HYDRGEL CORDED

## (undated) DEVICE — ROUND TIP SCISSORS: Brand: ENDOWRIST

## (undated) DEVICE — BLADE,STAINLESS-STEEL,11,STRL,DISPOSABLE: Brand: MEDLINE

## (undated) DEVICE — DRAPE,LAP,CHOLE,W/TROUGHS,STERILE: Brand: MEDLINE

## (undated) DEVICE — INSUFFLATION TUBING SET WITH FILTER, FUNNEL CONNECTOR AND LUER LOCK: Brand: JOSNOE MEDICAL INC

## (undated) DEVICE — DOUBLE BASIN SET: Brand: MEDLINE INDUSTRIES, INC.

## (undated) DEVICE — PROGRASP FORCEPS: Brand: ENDOWRIST

## (undated) DEVICE — TIP COVER ACCESSORY